# Patient Record
Sex: FEMALE | Race: WHITE | NOT HISPANIC OR LATINO | Employment: FULL TIME | URBAN - METROPOLITAN AREA
[De-identification: names, ages, dates, MRNs, and addresses within clinical notes are randomized per-mention and may not be internally consistent; named-entity substitution may affect disease eponyms.]

---

## 2023-08-24 DIAGNOSIS — E07.9 THYROID DISORDER: Primary | ICD-10-CM

## 2023-08-24 RX ORDER — LEVOTHYROXINE SODIUM 75 UG/1
75 TABLET ORAL DAILY
Qty: 90 TABLET | Refills: 0 | Status: SHIPPED | OUTPATIENT
Start: 2023-08-24 | End: 2023-09-26 | Stop reason: SDUPTHER

## 2023-08-24 RX ORDER — LEVOTHYROXINE SODIUM 75 UG/1
1 TABLET ORAL DAILY
COMMUNITY
Start: 2015-07-21 | End: 2023-08-24 | Stop reason: SDUPTHER

## 2023-08-29 DIAGNOSIS — Z30.9 ENCOUNTER FOR CONTRACEPTIVE MANAGEMENT, UNSPECIFIED TYPE: ICD-10-CM

## 2023-08-29 RX ORDER — NORETHINDRONE ACETATE AND ETHINYL ESTRADIOL 5-7-9-7
1 KIT ORAL DAILY
COMMUNITY
Start: 2023-06-06 | End: 2023-08-29 | Stop reason: SDUPTHER

## 2023-08-29 RX ORDER — NORETHINDRONE ACETATE AND ETHINYL ESTRADIOL 5-7-9-7
1 KIT ORAL DAILY
Qty: 94 TABLET | Refills: 0 | Status: SHIPPED | OUTPATIENT
Start: 2023-08-29 | End: 2023-09-26 | Stop reason: SDUPTHER

## 2023-09-19 ASSESSMENT — PROMIS GLOBAL HEALTH SCALE
RATE_GENERAL_HEALTH: VERY GOOD
RATE_QUALITY_OF_LIFE: VERY GOOD
CARRYOUT_SOCIAL_ACTIVITIES: VERY GOOD
RATE_AVERAGE_PAIN: 1
RATE_MENTAL_HEALTH: VERY GOOD
CARRYOUT_PHYSICAL_ACTIVITIES: MOSTLY
RATE_PHYSICAL_HEALTH: VERY GOOD
RATE_AVERAGE_FATIGUE: MODERATE
RATE_SOCIAL_SATISFACTION: VERY GOOD
EMOTIONAL_PROBLEMS: RARELY

## 2023-09-26 ENCOUNTER — OFFICE VISIT (OUTPATIENT)
Dept: PRIMARY CARE | Facility: CLINIC | Age: 43
End: 2023-09-26
Payer: COMMERCIAL

## 2023-09-26 VITALS
BODY MASS INDEX: 45.04 KG/M2 | SYSTOLIC BLOOD PRESSURE: 124 MMHG | HEART RATE: 78 BPM | WEIGHT: 263.8 LBS | TEMPERATURE: 97.5 F | DIASTOLIC BLOOD PRESSURE: 76 MMHG | RESPIRATION RATE: 16 BRPM | HEIGHT: 64 IN

## 2023-09-26 DIAGNOSIS — Z30.9 ENCOUNTER FOR CONTRACEPTIVE MANAGEMENT, UNSPECIFIED TYPE: ICD-10-CM

## 2023-09-26 DIAGNOSIS — Z12.31 SCREENING MAMMOGRAM, ENCOUNTER FOR: ICD-10-CM

## 2023-09-26 DIAGNOSIS — Z00.00 ROUTINE GENERAL MEDICAL EXAMINATION AT A HEALTH CARE FACILITY: Primary | ICD-10-CM

## 2023-09-26 DIAGNOSIS — E07.9 THYROID DISORDER: ICD-10-CM

## 2023-09-26 DIAGNOSIS — Z12.4 CERVICAL CANCER SCREENING: ICD-10-CM

## 2023-09-26 DIAGNOSIS — E03.9 HYPOTHYROIDISM, UNSPECIFIED TYPE: ICD-10-CM

## 2023-09-26 PROBLEM — G43.909 MIGRAINES: Status: ACTIVE | Noted: 2023-09-26

## 2023-09-26 PROBLEM — E78.5 HYPERLIPEMIA: Status: ACTIVE | Noted: 2023-09-26

## 2023-09-26 PROCEDURE — 1036F TOBACCO NON-USER: CPT | Performed by: FAMILY MEDICINE

## 2023-09-26 PROCEDURE — 87624 HPV HI-RISK TYP POOLED RSLT: CPT

## 2023-09-26 PROCEDURE — 99396 PREV VISIT EST AGE 40-64: CPT | Performed by: FAMILY MEDICINE

## 2023-09-26 PROCEDURE — 88175 CYTOPATH C/V AUTO FLUID REDO: CPT

## 2023-09-26 RX ORDER — LEVOTHYROXINE SODIUM 75 UG/1
75 TABLET ORAL DAILY
Qty: 90 TABLET | Refills: 3 | Status: SHIPPED | OUTPATIENT
Start: 2023-09-26

## 2023-09-26 RX ORDER — NORETHINDRONE ACETATE AND ETHINYL ESTRADIOL 5-7-9-7
1 KIT ORAL DAILY
Qty: 94 TABLET | Refills: 3 | Status: SHIPPED | OUTPATIENT
Start: 2023-09-26 | End: 2024-05-22 | Stop reason: SDUPTHER

## 2023-09-26 NOTE — PROGRESS NOTES
"Subjective   Patient ID: Rylie Mcintosh is a 43 y.o. female who presents for Annual Exam (Patient due for a pap smear and mammogram order too. ).    Subjective  Reason for Visit: Rylie Mcintosh is an 43 y.o. female here for a Wellness visit.   Any concerns: No    Past Medical, Surgical, and Family History reviewed and updated in chart.    Reviewed all medications by prescribing practitioner or clinical pharmacist (such as prescriptions, OTCs, herbal therapies and supplements) and documented in the medical record.     Back on BCP  Menses normal: Yes  PAP: No  Normal PAP: Yes  Mammogram: Yes  Dentist: Yes  Vitamins: No  Exercise: Yes  sometimes   Dermatologist: Yes  Immunizations up to date: No does not do flu shot   and tetanus shot             Review of Systems    Objective   /76   Pulse 78   Temp 36.4 °C (97.5 °F)   Resp 16   Ht 1.626 m (5' 4\")   Wt 120 kg (263 lb 12.8 oz)   BMI 45.28 kg/m²     Physical Exam  Vitals and nursing note reviewed.   Constitutional:       General: She is not in acute distress.     Appearance: Normal appearance.   HENT:      Head: Normocephalic and atraumatic.      Right Ear: Tympanic membrane, ear canal and external ear normal.      Left Ear: Tympanic membrane, ear canal and external ear normal.      Nose: Nose normal.      Mouth/Throat:      Lips: Pink.      Mouth: Mucous membranes are moist.      Pharynx: Oropharynx is clear. No oropharyngeal exudate or posterior oropharyngeal erythema.   Eyes:      Conjunctiva/sclera: Conjunctivae normal.   Neck:      Thyroid: No thyroid mass or thyromegaly.   Cardiovascular:      Rate and Rhythm: Normal rate and regular rhythm.      Pulses: Normal pulses.   Pulmonary:      Effort: Pulmonary effort is normal. No respiratory distress.      Breath sounds: Normal breath sounds. No wheezing.   Chest:   Breasts:     Breasts are symmetrical.      Right: Normal. No mass, skin change or tenderness.      Left: Normal. No mass, skin change or " tenderness.   Abdominal:      General: Abdomen is flat. Bowel sounds are normal. There is no distension.      Palpations: Abdomen is soft. There is no mass.      Tenderness: There is no abdominal tenderness.      Hernia: No hernia is present.   Genitourinary:     General: Normal vulva.      Labia:         Left: No tenderness or lesion.       Urethra: No urethral pain or urethral lesion.      Vagina: Normal. No vaginal discharge or lesions.      Cervix: Normal.      Uterus: Normal.       Adnexa: Right adnexa normal and left adnexa normal.   Musculoskeletal:      Cervical back: Normal range of motion and neck supple.      Right lower leg: No edema.      Left lower leg: No edema.   Lymphadenopathy:      Cervical: No cervical adenopathy.   Skin:     General: Skin is warm and dry.      Findings: No rash.   Neurological:      General: No focal deficit present.      Mental Status: She is alert and oriented to person, place, and time.      Motor: No weakness.   Psychiatric:         Mood and Affect: Mood normal.         Speech: Speech normal.         Behavior: Behavior normal. Behavior is cooperative.         Thought Content: Thought content normal.         Judgment: Judgment normal.         Assessment/Plan   Problem List Items Addressed This Visit             ICD-10-CM    Hypothyroidism E03.9    Relevant Orders    Thyroid Stimulating Hormone     Other Visit Diagnoses         Codes    Routine general medical examination at a health care facility    -  Primary Z00.00    Cervical cancer screening     Z12.4    Relevant Orders    THINPREP PAP TEST    Screening mammogram, encounter for     Z12.31    Relevant Orders    BI mammo bilateral screening tomosynthesis    Encounter for contraceptive management, unspecified type     Z30.9    Relevant Medications    Tilia Fe 1-20(5)/1-30(7) /1mg-35mcg (9) tablet    Thyroid disorder     E07.9    Relevant Medications    levothyroxine (Synthroid, Levoxyl) 75 mcg tablet

## 2023-10-05 LAB
COMPLETE PATHOLOGY REPORT: NORMAL
CONVERTED CLINICAL DIAGNOSIS-HISTORY: NORMAL
CONVERTED DIAGNOSIS COMMENT: NORMAL
CONVERTED FINAL DIAGNOSIS: NORMAL
CONVERTED FINAL REPORT PDF LINK TO COPY AND PASTE: NORMAL

## 2023-10-19 ENCOUNTER — APPOINTMENT (OUTPATIENT)
Dept: RADIOLOGY | Facility: CLINIC | Age: 43
End: 2023-10-19
Payer: COMMERCIAL

## 2023-11-20 ENCOUNTER — LAB (OUTPATIENT)
Dept: LAB | Facility: LAB | Age: 43
End: 2023-11-20
Payer: COMMERCIAL

## 2023-11-20 ENCOUNTER — ANCILLARY PROCEDURE (OUTPATIENT)
Dept: RADIOLOGY | Facility: CLINIC | Age: 43
End: 2023-11-20
Payer: COMMERCIAL

## 2023-11-20 DIAGNOSIS — E03.9 HYPOTHYROIDISM, UNSPECIFIED TYPE: ICD-10-CM

## 2023-11-20 DIAGNOSIS — R92.8 ABNORMAL MAMMOGRAM OF LEFT BREAST: Primary | ICD-10-CM

## 2023-11-20 DIAGNOSIS — Z12.31 SCREENING MAMMOGRAM, ENCOUNTER FOR: ICD-10-CM

## 2023-11-20 LAB — TSH SERPL-ACNC: 2.31 MIU/L (ref 0.44–3.98)

## 2023-11-20 PROCEDURE — 77067 SCR MAMMO BI INCL CAD: CPT

## 2023-11-20 PROCEDURE — 77063 BREAST TOMOSYNTHESIS BI: CPT | Mod: BILATERAL PROCEDURE | Performed by: RADIOLOGY

## 2023-11-20 PROCEDURE — 77067 SCR MAMMO BI INCL CAD: CPT | Mod: BILATERAL PROCEDURE | Performed by: RADIOLOGY

## 2023-11-20 PROCEDURE — 36415 COLL VENOUS BLD VENIPUNCTURE: CPT

## 2023-11-20 PROCEDURE — 84443 ASSAY THYROID STIM HORMONE: CPT

## 2023-12-14 ENCOUNTER — HOSPITAL ENCOUNTER (OUTPATIENT)
Dept: RADIOLOGY | Facility: HOSPITAL | Age: 43
Discharge: HOME | End: 2023-12-14
Payer: COMMERCIAL

## 2023-12-14 DIAGNOSIS — R92.8 ABNORMAL MAMMOGRAM OF LEFT BREAST: ICD-10-CM

## 2023-12-14 PROCEDURE — 77061 BREAST TOMOSYNTHESIS UNI: CPT | Mod: LEFT SIDE | Performed by: RADIOLOGY

## 2023-12-14 PROCEDURE — 77065 DX MAMMO INCL CAD UNI: CPT | Mod: LEFT SIDE | Performed by: RADIOLOGY

## 2023-12-14 PROCEDURE — 77061 BREAST TOMOSYNTHESIS UNI: CPT | Mod: LT

## 2023-12-14 PROCEDURE — 76642 ULTRASOUND BREAST LIMITED: CPT | Mod: LEFT SIDE | Performed by: RADIOLOGY

## 2023-12-14 PROCEDURE — 76642 ULTRASOUND BREAST LIMITED: CPT | Mod: LT

## 2023-12-14 PROCEDURE — 77065 DX MAMMO INCL CAD UNI: CPT | Mod: LT

## 2024-03-17 ENCOUNTER — TELEMEDICINE (OUTPATIENT)
Dept: PRIMARY CARE | Facility: CLINIC | Age: 44
End: 2024-03-17
Payer: COMMERCIAL

## 2024-03-17 DIAGNOSIS — B96.89 BACTERIAL CONJUNCTIVITIS OF BOTH EYES: Primary | ICD-10-CM

## 2024-03-17 DIAGNOSIS — H10.9 BACTERIAL CONJUNCTIVITIS OF BOTH EYES: Primary | ICD-10-CM

## 2024-03-17 PROCEDURE — 99213 OFFICE O/P EST LOW 20 MIN: CPT | Performed by: NURSE PRACTITIONER

## 2024-03-17 PROCEDURE — 1036F TOBACCO NON-USER: CPT | Performed by: NURSE PRACTITIONER

## 2024-03-17 RX ORDER — TOBRAMYCIN 3 MG/ML
2 SOLUTION/ DROPS OPHTHALMIC EVERY 4 HOURS
Qty: 5 ML | Refills: 0 | Status: SHIPPED | OUTPATIENT
Start: 2024-03-17 | End: 2024-03-24

## 2024-03-17 ASSESSMENT — ENCOUNTER SYMPTOMS
CONSTITUTIONAL NEGATIVE: 1
EYE REDNESS: 1
EYE DISCHARGE: 1
EYE PAIN: 0
GASTROINTESTINAL NEGATIVE: 1
RESPIRATORY NEGATIVE: 1
EYE ITCHING: 1
CARDIOVASCULAR NEGATIVE: 1
MUSCULOSKELETAL NEGATIVE: 1

## 2024-03-17 NOTE — PROGRESS NOTES
Symptoms started last night. Patient went to [employer] ([employer] and they did a complete makeover with her last night. Patient has redness, itching, crusting of both eyes. No known injury to the eye. No other URI symptoms.     Review of Systems   Constitutional: Negative.    HENT: Negative.     Eyes:  Positive for discharge, redness and itching. Negative for pain and visual disturbance.   Respiratory: Negative.     Cardiovascular: Negative.    Gastrointestinal: Negative.    Musculoskeletal: Negative.    Skin: Negative.      Objective   There were no vitals taken for this visit.    Physical Exam  Constitutional:       General: She is not in acute distress.     Appearance: Normal appearance. She is not toxic-appearing.   Eyes:      General: Lids are normal.      Conjunctiva/sclera:      Right eye: Right conjunctiva is injected.      Left eye: Left conjunctiva is injected.      Comments: Exam limited due to the nature of the visit    Neurological:      Mental Status: She is alert.     Assessment/Plan   Problem List Items Addressed This Visit    None  Visit Diagnoses         Codes    Bacterial conjunctivitis of both eyes    -  Primary H10.9, B96.89    Relevant Medications    tobramycin (Tobrex) 0.3 % ophthalmic solution        Patient will begin using antibiotic eye drops as prescribed.  Advised may use warm, moist compresses for eye drainage, crusting.  Avoid contacts while on eye drops. Wash hands frequently.  Advised may be contagious until 24 hours on eye drops. Advised ER for any worsening eye swelling/drainage or new/concerning symptoms. Follow up in person if symptoms not improving after additional 2-3 days.

## 2024-05-21 ENCOUNTER — HOSPITAL ENCOUNTER (EMERGENCY)
Facility: HOSPITAL | Age: 44
Discharge: HOME | End: 2024-05-21
Attending: EMERGENCY MEDICINE
Payer: COMMERCIAL

## 2024-05-21 ENCOUNTER — APPOINTMENT (OUTPATIENT)
Dept: RADIOLOGY | Facility: HOSPITAL | Age: 44
End: 2024-05-21
Payer: COMMERCIAL

## 2024-05-21 ENCOUNTER — APPOINTMENT (OUTPATIENT)
Dept: CARDIOLOGY | Facility: HOSPITAL | Age: 44
End: 2024-05-21
Payer: COMMERCIAL

## 2024-05-21 VITALS
WEIGHT: 270 LBS | HEIGHT: 64 IN | RESPIRATION RATE: 18 BRPM | HEART RATE: 82 BPM | DIASTOLIC BLOOD PRESSURE: 99 MMHG | BODY MASS INDEX: 46.1 KG/M2 | SYSTOLIC BLOOD PRESSURE: 172 MMHG | TEMPERATURE: 97 F | OXYGEN SATURATION: 98 %

## 2024-05-21 DIAGNOSIS — I10 HYPERTENSION, UNSPECIFIED TYPE: Primary | ICD-10-CM

## 2024-05-21 LAB
ALBUMIN SERPL BCP-MCNC: 4.2 G/DL (ref 3.4–5)
ALP SERPL-CCNC: 44 U/L (ref 33–110)
ALT SERPL W P-5'-P-CCNC: 16 U/L (ref 7–45)
ANION GAP SERPL CALC-SCNC: 12 MMOL/L (ref 10–20)
AST SERPL W P-5'-P-CCNC: 19 U/L (ref 9–39)
B-HCG SERPL-ACNC: <2 MIU/ML
BASOPHILS # BLD AUTO: 0.03 X10*3/UL (ref 0–0.1)
BASOPHILS NFR BLD AUTO: 0.4 %
BILIRUB SERPL-MCNC: 0.3 MG/DL (ref 0–1.2)
BUN SERPL-MCNC: 12 MG/DL (ref 6–23)
CALCIUM SERPL-MCNC: 9.1 MG/DL (ref 8.6–10.3)
CARDIAC TROPONIN I PNL SERPL HS: 3 NG/L (ref 0–13)
CARDIAC TROPONIN I PNL SERPL HS: <3 NG/L (ref 0–13)
CHLORIDE SERPL-SCNC: 103 MMOL/L (ref 98–107)
CO2 SERPL-SCNC: 25 MMOL/L (ref 21–32)
CREAT SERPL-MCNC: 0.83 MG/DL (ref 0.5–1.05)
EGFRCR SERPLBLD CKD-EPI 2021: 89 ML/MIN/1.73M*2
EOSINOPHIL # BLD AUTO: 0.11 X10*3/UL (ref 0–0.7)
EOSINOPHIL NFR BLD AUTO: 1.6 %
ERYTHROCYTE [DISTWIDTH] IN BLOOD BY AUTOMATED COUNT: 12.8 % (ref 11.5–14.5)
GLUCOSE SERPL-MCNC: 124 MG/DL (ref 74–99)
HCT VFR BLD AUTO: 42.5 % (ref 36–46)
HGB BLD-MCNC: 13.7 G/DL (ref 12–16)
IMM GRANULOCYTES # BLD AUTO: 0.03 X10*3/UL (ref 0–0.7)
IMM GRANULOCYTES NFR BLD AUTO: 0.4 % (ref 0–0.9)
LYMPHOCYTES # BLD AUTO: 1.4 X10*3/UL (ref 1.2–4.8)
LYMPHOCYTES NFR BLD AUTO: 20.5 %
MAGNESIUM SERPL-MCNC: 2.1 MG/DL (ref 1.6–2.4)
MCH RBC QN AUTO: 28.1 PG (ref 26–34)
MCHC RBC AUTO-ENTMCNC: 32.2 G/DL (ref 32–36)
MCV RBC AUTO: 87 FL (ref 80–100)
MONOCYTES # BLD AUTO: 0.55 X10*3/UL (ref 0.1–1)
MONOCYTES NFR BLD AUTO: 8.1 %
NEUTROPHILS # BLD AUTO: 4.7 X10*3/UL (ref 1.2–7.7)
NEUTROPHILS NFR BLD AUTO: 69 %
NRBC BLD-RTO: 0 /100 WBCS (ref 0–0)
PLATELET # BLD AUTO: 310 X10*3/UL (ref 150–450)
POTASSIUM SERPL-SCNC: 3.8 MMOL/L (ref 3.5–5.3)
PROT SERPL-MCNC: 7.3 G/DL (ref 6.4–8.2)
RBC # BLD AUTO: 4.88 X10*6/UL (ref 4–5.2)
SODIUM SERPL-SCNC: 136 MMOL/L (ref 136–145)
WBC # BLD AUTO: 6.8 X10*3/UL (ref 4.4–11.3)

## 2024-05-21 PROCEDURE — 80053 COMPREHEN METABOLIC PANEL: CPT | Performed by: EMERGENCY MEDICINE

## 2024-05-21 PROCEDURE — 84702 CHORIONIC GONADOTROPIN TEST: CPT | Performed by: EMERGENCY MEDICINE

## 2024-05-21 PROCEDURE — 84484 ASSAY OF TROPONIN QUANT: CPT | Performed by: EMERGENCY MEDICINE

## 2024-05-21 PROCEDURE — 71045 X-RAY EXAM CHEST 1 VIEW: CPT | Performed by: RADIOLOGY

## 2024-05-21 PROCEDURE — 99285 EMERGENCY DEPT VISIT HI MDM: CPT | Performed by: EMERGENCY MEDICINE

## 2024-05-21 PROCEDURE — 85025 COMPLETE CBC W/AUTO DIFF WBC: CPT | Performed by: EMERGENCY MEDICINE

## 2024-05-21 PROCEDURE — 93005 ELECTROCARDIOGRAM TRACING: CPT

## 2024-05-21 PROCEDURE — 83735 ASSAY OF MAGNESIUM: CPT | Performed by: EMERGENCY MEDICINE

## 2024-05-21 PROCEDURE — 36415 COLL VENOUS BLD VENIPUNCTURE: CPT | Performed by: EMERGENCY MEDICINE

## 2024-05-21 PROCEDURE — 93010 ELECTROCARDIOGRAM REPORT: CPT | Performed by: EMERGENCY MEDICINE

## 2024-05-21 PROCEDURE — 99283 EMERGENCY DEPT VISIT LOW MDM: CPT | Mod: 25

## 2024-05-21 PROCEDURE — 71045 X-RAY EXAM CHEST 1 VIEW: CPT

## 2024-05-21 RX ORDER — LOSARTAN POTASSIUM 50 MG/1
50 TABLET ORAL DAILY
Qty: 5 TABLET | Refills: 0 | Status: SHIPPED | OUTPATIENT
Start: 2024-05-21 | End: 2024-05-21

## 2024-05-21 RX ORDER — LOSARTAN POTASSIUM 50 MG/1
50 TABLET ORAL DAILY
Qty: 5 TABLET | Refills: 0 | Status: SHIPPED | OUTPATIENT
Start: 2024-05-21 | End: 2024-05-22 | Stop reason: SDUPTHER

## 2024-05-21 ASSESSMENT — COLUMBIA-SUICIDE SEVERITY RATING SCALE - C-SSRS
6. HAVE YOU EVER DONE ANYTHING, STARTED TO DO ANYTHING, OR PREPARED TO DO ANYTHING TO END YOUR LIFE?: NO
1. IN THE PAST MONTH, HAVE YOU WISHED YOU WERE DEAD OR WISHED YOU COULD GO TO SLEEP AND NOT WAKE UP?: NO
2. HAVE YOU ACTUALLY HAD ANY THOUGHTS OF KILLING YOURSELF?: NO

## 2024-05-21 ASSESSMENT — LIFESTYLE VARIABLES
TOTAL SCORE: 0
EVER HAD A DRINK FIRST THING IN THE MORNING TO STEADY YOUR NERVES TO GET RID OF A HANGOVER: NO
EVER FELT BAD OR GUILTY ABOUT YOUR DRINKING: NO
HAVE PEOPLE ANNOYED YOU BY CRITICIZING YOUR DRINKING: NO
HAVE YOU EVER FELT YOU SHOULD CUT DOWN ON YOUR DRINKING: NO

## 2024-05-21 ASSESSMENT — PAIN SCALES - GENERAL: PAINLEVEL_OUTOF10: 0 - NO PAIN

## 2024-05-21 ASSESSMENT — PAIN - FUNCTIONAL ASSESSMENT: PAIN_FUNCTIONAL_ASSESSMENT: 0-10

## 2024-05-21 NOTE — ED PROVIDER NOTES
EMERGENCY DEPARTMENT ENCOUNTER      Pt Name: Rylie Mcintosh  MRN: [id number]  Birthdate 1980  Date of evaluation: 5/21/2024  Provider: Carolina Cook MD    CHIEF COMPLAINT       Chief Complaint   Patient presents with    Hypertension         HISTORY OF PRESENT ILLNESS    HPI  Rylie is a 44-year-old female with a history of migraine, hyperlipidemia, hypothyroidism, obesity, and elevated blood pressure who presents to the ED with complaint of hypertension.  Patient stated that she been having headache for 4 days which she thought was due to her migraine.  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  The nurse checked her blood pressure and found that it was elevated.  So she was referred to the ED.  On arrival to the ED, blood pressure was 213/101.  Patient stated headache has resolved after she took Excedrin and Sudafed.  Family history significant for hypertension and diabetes in both parents.  She denies headache, fever, dizziness, lightheadedness, change in her vision, nausea, vomiting, shortness of breath, chest pain, abdominal pain, or urinary symptoms.        Nursing Notes were reviewed.    PAST MEDICAL HISTORY     Past Medical History:   Diagnosis Date    Calculus of gallbladder without cholecystitis without obstruction 03/28/2016    Gallstones    Unspecified abnormal cytological findings in specimens from vagina     Abnormal vaginal Pap smear         SURGICAL HISTORY       Past Surgical History:   Procedure Laterality Date    CHOLECYSTECTOMY  10/10/2016    Cholecystectomy Laparoscopic    MOUTH SURGERY  03/28/2016    Oral Surgery Tooth Extraction         CURRENT MEDICATIONS       Previous Medications    LEVOTHYROXINE (SYNTHROID, LEVOXYL) 75 MCG TABLET    Take 1 tablet (75 mcg) by mouth once daily.    TILIA FE 1-20(5)/1-30(7) /1MG-35MCG (9) TABLET    Take 1 tablet by mouth once daily.       ALLERGIES     Sulfa (sulfonamide antibiotics)    FAMILY HISTORY     No  family history on file.       SOCIAL HISTORY       Social History     Socioeconomic History    Marital status:      Spouse name: Not on file    Number of children: Not on file    Years of education: Not on file    Highest education level: Not on file   Occupational History    Not on file   Tobacco Use    Smoking status: Never    Smokeless tobacco: Never   Substance and Sexual Activity    Alcohol use: Yes    Drug use: Never    Sexual activity: Not on file   Other Topics Concern    Not on file   Social History Narrative    Not on file     Social Determinants of Health     Financial Resource Strain: Not on file   Food Insecurity: Not on file   Transportation Needs: Not on file   Physical Activity: Not on file   Stress: Not on file   Social Connections: Not on file   Intimate Partner Violence: Not on file   Housing Stability: Not on file       SCREENINGS                        PHYSICAL EXAM    (up to 7 for level 4, 8 or more for level 5)     ED Triage Vitals [05/21/24 1416]   Temperature Heart Rate Respirations BP   36.1 °C (97 °F) 99 18 (!) 213/101      Pulse Ox Temp Source Heart Rate Source Patient Position   99 % Temporal Monitor Sitting      BP Location FiO2 (%)     -- --       Physical Exam  Vitals and nursing note reviewed.   Constitutional:       General: She is not in acute distress.     Appearance: Normal appearance. She is well-developed. She is obese. She is not ill-appearing, toxic-appearing or diaphoretic.   HENT:      Head: Normocephalic and atraumatic.      Right Ear: External ear normal.      Left Ear: External ear normal.      Nose: No congestion or rhinorrhea.      Mouth/Throat:      Pharynx: No oropharyngeal exudate or posterior oropharyngeal erythema.   Eyes:      General: No scleral icterus.        Right eye: No discharge.         Left eye: No discharge.      Extraocular Movements: Extraocular movements intact.      Conjunctiva/sclera: Conjunctivae normal.      Pupils: Pupils are equal,  round, and reactive to light.   Cardiovascular:      Rate and Rhythm: Normal rate and regular rhythm.      Pulses: Normal pulses.      Heart sounds: Normal heart sounds. No murmur heard.     No friction rub. No gallop.   Pulmonary:      Effort: Pulmonary effort is normal. No respiratory distress.      Breath sounds: Normal breath sounds. No stridor. No wheezing, rhonchi or rales.   Abdominal:      General: There is no distension.      Palpations: Abdomen is soft.      Tenderness: There is no abdominal tenderness. There is no guarding.   Musculoskeletal:         General: No swelling, tenderness, deformity or signs of injury.      Cervical back: Normal range of motion and neck supple. No rigidity.      Right lower leg: No edema.      Left lower leg: No edema.   Skin:     General: Skin is warm and dry.      Capillary Refill: Capillary refill takes less than 2 seconds.      Coloration: Skin is not jaundiced or pale.      Findings: No bruising, erythema, lesion or rash.   Neurological:      General: No focal deficit present.      Mental Status: She is alert and oriented to person, place, and time. Mental status is at baseline.      Sensory: No sensory deficit.      Motor: No weakness.   Psychiatric:         Mood and Affect: Mood normal.         Behavior: Behavior normal.         Thought Content: Thought content normal.          DIAGNOSTIC RESULTS     LABS:  Labs Reviewed   COMPREHENSIVE METABOLIC PANEL - Abnormal       Result Value    Glucose 124 (*)     Sodium 136      Potassium 3.8      Chloride 103      Bicarbonate 25      Anion Gap 12      Urea Nitrogen 12      Creatinine 0.83      eGFR 89      Calcium 9.1      Albumin 4.2      Alkaline Phosphatase 44      Total Protein 7.3      AST 19      Bilirubin, Total 0.3      ALT 16     MAGNESIUM - Normal    Magnesium 2.10     SERIAL TROPONIN-INITIAL - Normal    Troponin I, High Sensitivity <3      Narrative:     Less than 99th percentile of normal range cutoff-  Female and  children under 18 years old <14 ng/L; Male <21 ng/L: Negative  Repeat testing should be performed if clinically indicated.     Female and children under 18 years old 14-50 ng/L; Male 21-50 ng/L:  Consistent with possible cardiac damage and possible increased clinical   risk. Serial measurements may help to assess extent of myocardial damage.     >50 ng/L: Consistent with cardiac damage, increased clinical risk and  myocardial infarction. Serial measurements may help assess extent of   myocardial damage.      NOTE: Children less than 1 year old may have higher baseline troponin   levels and results should be interpreted in conjunction with the overall   clinical context.     NOTE: Troponin I testing is performed using a different   testing methodology at [facility] than at other   [facility]. Direct result comparisons should only   be made within the same method.   CBC WITH AUTO DIFFERENTIAL    WBC 6.8      nRBC 0.0      RBC 4.88      Hemoglobin 13.7      Hematocrit 42.5      MCV 87      MCH 28.1      MCHC 32.2      RDW 12.8      Platelets 310      Neutrophils % 69.0      Immature Granulocytes %, Automated 0.4      Lymphocytes % 20.5      Monocytes % 8.1      Eosinophils % 1.6      Basophils % 0.4      Neutrophils Absolute 4.70      Immature Granulocytes Absolute, Automated 0.03      Lymphocytes Absolute 1.40      Monocytes Absolute 0.55      Eosinophils Absolute 0.11      Basophils Absolute 0.03     TROPONIN SERIES- (INITIAL, 1 HR)    Narrative:     The following orders were created for panel order Troponin Series, (0, 1 HR).  Procedure                               Abnormality         Status                     ---------                               -----------         ------                     Troponin I, High Sensiti...[[id number]]  Normal              Final result               Troponin, High Sensitivi...[[id number]]                                                   Please view results for  these tests on the individual orders.   SERIAL TROPONIN, 1 HOUR       All other labs were within normal range or not returned as of this dictation.    Imaging  XR chest 1 view   Final Result   No acute cardiopulmonary disease.        MACRO:   none        Signed by: Katelynn Franco 5/21/2024 2:46 PM   Dictation workstation:   [id number]           Procedures  Procedures     EMERGENCY DEPARTMENT COURSE/MDM:     Diagnoses as of 05/21/24 1729   Hypertension, unspecified type        Medical Decision Making  Patient is a 44-year-old female with a history of migraine, hyperlipidemia, hypothyroidism, obesity, and elevated blood pressure who presents to the ED with complaint of hypertension.  Awake, alert, and oriented.  She is well-appearing, afebrile, nontoxic, not in apparent distress.  Cardiac workup was ordered.  Lab results are within normal ranges.  Chest x-ray shows no acute cardiopulmonary finding.    EKG shows normal sinus rhythm with 81 bpm, normal axis position, no QT prolongation, no ST elevation.    The case was discussed with the attending, Dr. Rich, who saw and evaluated the patient at the bedside.  The patient was updated with the lab and imaging results.  Will consider starting the patient on medication for blood pressure, but given that she has never been diagnosed or treated for high blood pressure, medicine was consulted to discuss management plan.  Dr. Chapman recommended to start the patient on 50 mg of losartan daily and have her follow-up outpatient by Friday.  On reassessment, the patient is hemodynamically stable and was informed about the management plan. She expressed understanding and agreed to the plan.  The patient was discharged in a stable condition with return precautions.        Patient and or family in agreement and understanding of treatment plan.  All questions answered.      I reviewed the case with the attending ED physician. The attending ED physician agrees with the plan. Patient  and/or patient´s representative was counseled regarding labs, imaging, likely diagnosis, and plan. All questions were answered.    ED Medications administered this visit:  Medications - No data to display    New Prescriptions from this visit:    New Prescriptions    No medications on file       Follow-up:  No follow-up provider specified.      Final Impression: No diagnosis found.      (Please note that portions of this note were completed with a voice recognition program.  Efforts were made to edit the dictations but occasionally words are mis-transcribed.)     Carolina Cook MD  Resident  05/21/24 [id number]

## 2024-05-21 NOTE — DISCHARGE INSTRUCTIONS
Thank you for giving us the opportunity to take care of you today.  Please take medication as prescribed at home.     Please follow-up with Dr. Pappas in within 1 to 2 days regarding your ED visit.    You should seek immediate medical attention if you experience new or worsening symptoms such as severe headache, change in vision, dizziness, lightheadedness, shortness of breath, chest pain, nausea, vomiting, abdominal pain, or fever.

## 2024-05-21 NOTE — ED NOTES
Patient to room B c/o headache for 2 days states her blood pressure is high no history of high blood pressure     Julianna Aquino, BETHANY  05/21/24 4006

## 2024-05-22 ENCOUNTER — OFFICE VISIT (OUTPATIENT)
Dept: PRIMARY CARE | Facility: CLINIC | Age: 44
End: 2024-05-22
Payer: COMMERCIAL

## 2024-05-22 VITALS
WEIGHT: 175 LBS | BODY MASS INDEX: 29.88 KG/M2 | DIASTOLIC BLOOD PRESSURE: 85 MMHG | HEIGHT: 64 IN | HEART RATE: 97 BPM | SYSTOLIC BLOOD PRESSURE: 141 MMHG

## 2024-05-22 DIAGNOSIS — N92.6 MISSED PERIODS: ICD-10-CM

## 2024-05-22 DIAGNOSIS — R06.83 SNORES: ICD-10-CM

## 2024-05-22 DIAGNOSIS — Z13.1 DIABETES MELLITUS SCREENING: ICD-10-CM

## 2024-05-22 DIAGNOSIS — Z30.9 ENCOUNTER FOR CONTRACEPTIVE MANAGEMENT, UNSPECIFIED TYPE: ICD-10-CM

## 2024-05-22 DIAGNOSIS — E03.9 HYPOTHYROIDISM, UNSPECIFIED TYPE: ICD-10-CM

## 2024-05-22 DIAGNOSIS — I10 HTN (HYPERTENSION), BENIGN: Primary | ICD-10-CM

## 2024-05-22 DIAGNOSIS — Z78.9 NON-SMOKER: ICD-10-CM

## 2024-05-22 DIAGNOSIS — E78.5 HYPERLIPIDEMIA, UNSPECIFIED HYPERLIPIDEMIA TYPE: ICD-10-CM

## 2024-05-22 DIAGNOSIS — I10 HYPERTENSION, UNSPECIFIED TYPE: ICD-10-CM

## 2024-05-22 PROCEDURE — 1036F TOBACCO NON-USER: CPT | Performed by: INTERNAL MEDICINE

## 2024-05-22 PROCEDURE — 3079F DIAST BP 80-89 MM HG: CPT | Performed by: INTERNAL MEDICINE

## 2024-05-22 PROCEDURE — 99215 OFFICE O/P EST HI 40 MIN: CPT | Performed by: INTERNAL MEDICINE

## 2024-05-22 PROCEDURE — 3077F SYST BP >= 140 MM HG: CPT | Performed by: INTERNAL MEDICINE

## 2024-05-22 RX ORDER — LOSARTAN POTASSIUM 50 MG/1
50 TABLET ORAL DAILY
Qty: 90 TABLET | Refills: 0 | Status: SHIPPED | OUTPATIENT
Start: 2024-05-22 | End: 2024-05-25 | Stop reason: SDUPTHER

## 2024-05-22 RX ORDER — NORETHINDRONE ACETATE AND ETHINYL ESTRADIOL 5-7-9-7
1 KIT ORAL DAILY
Qty: 94 TABLET | Refills: 3 | Status: SHIPPED | OUTPATIENT
Start: 2024-05-22

## 2024-05-22 RX ORDER — LOSARTAN POTASSIUM 50 MG/1
50 TABLET ORAL DAILY
Qty: 30 TABLET | Refills: 0 | Status: SHIPPED | OUTPATIENT
Start: 2024-05-22 | End: 2024-05-22

## 2024-05-22 RX ORDER — LOSARTAN POTASSIUM 50 MG/1
50 TABLET ORAL DAILY
Qty: 30 TABLET | Refills: 0 | Status: SHIPPED | OUTPATIENT
Start: 2024-05-22 | End: 2024-05-22 | Stop reason: SDUPTHER

## 2024-05-22 ASSESSMENT — PATIENT HEALTH QUESTIONNAIRE - PHQ9
2. FEELING DOWN, DEPRESSED OR HOPELESS: NOT AT ALL
SUM OF ALL RESPONSES TO PHQ9 QUESTIONS 1 AND 2: 0
1. LITTLE INTEREST OR PLEASURE IN DOING THINGS: NOT AT ALL

## 2024-05-22 NOTE — PROGRESS NOTES
"Subjective   Patient ID: Rylie Mcintosh is a 44 y.o. female who presents for Follow-up ([facility] ER 5/21 follow up HTN).    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 Pt states that overall she is a healthy person, but for the last couple months she did not \"feel herself\". Pt states that she feels tired and has no MP for the last 3 months. Pt states that she is on the same OCP for the years and had a negative U pregnancy test yesterday at ED. Pt denies any abd pain. Pt states that she had some BW done last night and was prescribed losartan 50 mg po daily. The BP readings improved this morning. During the clinical encounter pt denies fever, chills, no SOB, no chest pain/tightness, no abdominal pain, no N/V/D reported, no change of urination reported. Pt denies any other health concerns during this visit.  Sohx: reviewed  List of the medications and allergies: reviewed  PMHx and Fhx: reviewed    Review of Systems  The systems have been reviewed as follows:   Constitutional: no fever, no chills,  but as mentioned at HPI  Eyes: no eyesight problems, no eye redness, no eye pain, no blurred vision  ENT: no ear pain or sore throat, no nasal discharge or congestion, no sinus pressure, no hearing loss  Cardiovascular: no chest pain or tightness, no SOB, the heart rate was not fast or slow  Respiratory: no cough, no dyspnea with exertion or with rest, no wheezing  Gastrointestinal: no abdominal pain, no constipation or diarrhea, no heartburn, no nausea or vomiting  Genitourinary: no urine frequency, no dysuria, no urinary urgency, no urinary incontinence or retention, but as mentioned at HPI  Musculoskeletal: no back pain, no arthralgia, no joint swelling or stiffness, no muscle weakness  Integumentary: no skin lesions or rash  Neurological: no headaches, no dizziness or fainting, no limb weakness  Psychiatric: no mood changes, no anxiety or depression, no " "SI/HI  Endocrine: no weight change, no temperature intolerance, no   change of appetite  Hematologic/Lymphatic: no easy bruising or bleeding  Objective   /85 (BP Location: Left arm, Patient Position: Sitting)   Pulse 97   Ht 1.626 m (5' 4\")   Wt 79.4 kg (175 lb)   BMI 30.04 kg/m²     Physical Exam  Constitutional: well hydrated, well nourished, no acute distress. Vital signs reviewed and BP reading was repeated 135/85 mmm HG  Head and face: normocephalic, atraumatic  Eyes: no erythema, edema or visible abnormalities of conjunctiva or lids. Pupils are equal, round and reactive to light. Extraocular movement normal  ENT: external ears without deformities  Neck: full ROM, no adenopathy, neck was supple, no palpable nodules of thyroid gland  Pulmonary: normal respiratory rate and effort. Lungs are clear to auscultation, no rales,no rhonchi or wheezing  Cardiovascular: RRR, normal S1 and S2, no murmur, no gallop, posterior tib. pulse normal 2+ bilaterally, no lower extremity edema  Abdomen: soft, non tender on palpation, not distended, normal BS in all 4 quadrants, no CVA tenderness  Musculoskeletal: no joint swelling, normal movements of all 4 extremities. Gait and station: normal, Digits and nails: normal without clubbing  Skin: normal color, no rash  Neurologic: Cranial nerves: CN II: visual acuity intact. Source: acuity reported by patient. Pupils reactive to light with normal accommodation. Right and left pupil normal CN III, IV and VI: the EO movements were intact. CN VIII: no hearing loss. Deep tendon reflexes: were 2+ and symmetric: R and L patella.  Sensory exam: normal to light touch  Psychiatric: Mood and affect: normal, Alert and Oriented x 3  Lymphatic: no cervical lymphadenopathy  Assessment/Plan   HTN, newly diagnosed:  Hx and PE as mentioned  The recent ED report was reviewed after the verbal permission was secured form the pt  Will provide the refill on losartan 50 mg PO daily  Pt was advised " to check BP on the regular basis  Will order UA and U alb  Will screen for DM    HLD: will check lipid panel  Hypothyroidism: Pt takes levothyroxine 75 mcg po daily. Will check TSH/T4 level  Missed MP: will check prolactin level, FSH/LH, DHEA. Will order US of the pelvis. Will bring the GYN team on board  The OCP was refilled as per the pts request  Pt reports chronic fatigue and snores:  The STOP BANG score at least 3  Will check vit D level and provide the referral for the sleep medicine team evaluation    I discussed every sxs with the pt in detail. Pt verbalized understanding of the health  condition and agreed with the clinical approach as discussed as mentioned above  Please FU with PCP as planned or sooner if needed.

## 2024-05-24 ENCOUNTER — TELEPHONE (OUTPATIENT)
Dept: PRIMARY CARE | Facility: CLINIC | Age: 44
End: 2024-05-24

## 2024-05-24 ENCOUNTER — LAB (OUTPATIENT)
Dept: LAB | Facility: LAB | Age: 44
End: 2024-05-24
Payer: COMMERCIAL

## 2024-05-24 DIAGNOSIS — E03.9 HYPOTHYROIDISM, UNSPECIFIED TYPE: ICD-10-CM

## 2024-05-24 DIAGNOSIS — Z13.1 DIABETES MELLITUS SCREENING: ICD-10-CM

## 2024-05-24 DIAGNOSIS — N92.6 MISSED PERIODS: ICD-10-CM

## 2024-05-24 DIAGNOSIS — I10 HTN (HYPERTENSION), BENIGN: ICD-10-CM

## 2024-05-24 DIAGNOSIS — E78.5 HYPERLIPIDEMIA, UNSPECIFIED HYPERLIPIDEMIA TYPE: ICD-10-CM

## 2024-05-24 LAB
25(OH)D3 SERPL-MCNC: 25 NG/ML (ref 30–100)
APPEARANCE UR: CLEAR
BILIRUB UR STRIP.AUTO-MCNC: NEGATIVE MG/DL
CHOLEST SERPL-MCNC: 249 MG/DL (ref 0–199)
CHOLESTEROL/HDL RATIO: 2.7
COLOR UR: COLORLESS
CREAT UR-MCNC: 22.6 MG/DL (ref 20–320)
EST. AVERAGE GLUCOSE BLD GHB EST-MCNC: 97 MG/DL
FSH SERPL-ACNC: 1.7 IU/L
GLUCOSE UR STRIP.AUTO-MCNC: NORMAL MG/DL
HBA1C MFR BLD: 5 %
HDLC SERPL-MCNC: 93.1 MG/DL
KETONES UR STRIP.AUTO-MCNC: NEGATIVE MG/DL
LDLC SERPL CALC-MCNC: 123 MG/DL
LEUKOCYTE ESTERASE UR QL STRIP.AUTO: ABNORMAL
LH SERPL-ACNC: 0.1 IU/L
MICROALBUMIN UR-MCNC: <7 MG/L
MICROALBUMIN/CREAT UR: NORMAL MG/G{CREAT}
NITRITE UR QL STRIP.AUTO: NEGATIVE
NON HDL CHOLESTEROL: 156 MG/DL (ref 0–149)
PH UR STRIP.AUTO: 5.5 [PH]
PROLACTIN SERPL-MCNC: 6.9 UG/L (ref 3–20)
PROT UR STRIP.AUTO-MCNC: NEGATIVE MG/DL
RBC # UR STRIP.AUTO: NEGATIVE /UL
RBC #/AREA URNS AUTO: ABNORMAL /HPF
SP GR UR STRIP.AUTO: 1
SQUAMOUS #/AREA URNS AUTO: ABNORMAL /HPF
TRIGL SERPL-MCNC: 163 MG/DL (ref 0–149)
TSH SERPL-ACNC: 1.86 MIU/L (ref 0.44–3.98)
UROBILINOGEN UR STRIP.AUTO-MCNC: NORMAL MG/DL
VLDL: 33 MG/DL (ref 0–40)
WBC #/AREA URNS AUTO: ABNORMAL /HPF

## 2024-05-24 PROCEDURE — 82306 VITAMIN D 25 HYDROXY: CPT

## 2024-05-24 PROCEDURE — 83001 ASSAY OF GONADOTROPIN (FSH): CPT

## 2024-05-24 PROCEDURE — 84443 ASSAY THYROID STIM HORMONE: CPT

## 2024-05-24 PROCEDURE — 81001 URINALYSIS AUTO W/SCOPE: CPT

## 2024-05-24 PROCEDURE — 83036 HEMOGLOBIN GLYCOSYLATED A1C: CPT

## 2024-05-24 PROCEDURE — 36415 COLL VENOUS BLD VENIPUNCTURE: CPT

## 2024-05-24 PROCEDURE — 83002 ASSAY OF GONADOTROPIN (LH): CPT

## 2024-05-24 PROCEDURE — 82626 DEHYDROEPIANDROSTERONE: CPT

## 2024-05-24 PROCEDURE — 84146 ASSAY OF PROLACTIN: CPT

## 2024-05-24 PROCEDURE — 82043 UR ALBUMIN QUANTITATIVE: CPT

## 2024-05-24 PROCEDURE — 80061 LIPID PANEL: CPT

## 2024-05-24 PROCEDURE — 82570 ASSAY OF URINE CREATININE: CPT

## 2024-05-25 DIAGNOSIS — I10 HYPERTENSION, UNSPECIFIED TYPE: ICD-10-CM

## 2024-05-25 DIAGNOSIS — I10 HTN (HYPERTENSION), BENIGN: ICD-10-CM

## 2024-05-25 RX ORDER — LOSARTAN POTASSIUM 50 MG/1
50 TABLET ORAL DAILY
Qty: 30 TABLET | Refills: 0 | Status: SHIPPED | OUTPATIENT
Start: 2024-05-25 | End: 2024-06-24

## 2024-05-26 LAB
ATRIAL RATE: 81 BPM
P AXIS: 47 DEGREES
P OFFSET: 205 MS
P ONSET: 150 MS
PR INTERVAL: 138 MS
Q ONSET: 219 MS
QRS COUNT: 13 BEATS
QRS DURATION: 86 MS
QT INTERVAL: 370 MS
QTC CALCULATION(BAZETT): 429 MS
QTC FREDERICIA: 408 MS
R AXIS: 43 DEGREES
T AXIS: 53 DEGREES
T OFFSET: 404 MS
VENTRICULAR RATE: 81 BPM

## 2024-05-28 ENCOUNTER — TELEPHONE (OUTPATIENT)
Dept: PRIMARY CARE | Facility: CLINIC | Age: 44
End: 2024-05-28
Payer: COMMERCIAL

## 2024-05-28 NOTE — TELEPHONE ENCOUNTER
----- Message from Kylah Gonzalez MD sent at 5/28/2024  9:12 AM EDT -----  Recent BW showed elevated cholesterol level, Vitamin D level decreased.  Please, FU within the next 1-2 weeks in the office.

## 2024-05-30 ENCOUNTER — HOSPITAL ENCOUNTER (OUTPATIENT)
Dept: RADIOLOGY | Facility: CLINIC | Age: 44
Discharge: HOME | End: 2024-05-30
Payer: COMMERCIAL

## 2024-05-30 DIAGNOSIS — N92.6 MISSED PERIODS: ICD-10-CM

## 2024-05-30 LAB — DHEA SERPL-MCNC: 2.21 NG/ML (ref 0.63–4.7)

## 2024-05-30 PROCEDURE — 76856 US EXAM PELVIC COMPLETE: CPT | Performed by: RADIOLOGY

## 2024-05-30 PROCEDURE — 93975 VASCULAR STUDY: CPT | Performed by: RADIOLOGY

## 2024-05-30 PROCEDURE — 76830 TRANSVAGINAL US NON-OB: CPT | Performed by: RADIOLOGY

## 2024-05-30 PROCEDURE — 76856 US EXAM PELVIC COMPLETE: CPT

## 2024-06-17 ASSESSMENT — ENCOUNTER SYMPTOMS
SWEATS: 0
HEADACHES: 1
BLURRED VISION: 0
ORTHOPNEA: 0
PND: 0
NECK PAIN: 0
PALPITATIONS: 0
HYPERTENSION: 1
SHORTNESS OF BREATH: 0

## 2024-06-24 ENCOUNTER — APPOINTMENT (OUTPATIENT)
Dept: PRIMARY CARE | Facility: CLINIC | Age: 44
End: 2024-06-24
Payer: COMMERCIAL

## 2024-06-24 VITALS
HEIGHT: 64 IN | TEMPERATURE: 97.2 F | WEIGHT: 274.8 LBS | SYSTOLIC BLOOD PRESSURE: 129 MMHG | BODY MASS INDEX: 46.92 KG/M2 | DIASTOLIC BLOOD PRESSURE: 80 MMHG

## 2024-06-24 DIAGNOSIS — Z71.2 ENCOUNTER TO DISCUSS TEST RESULTS: Primary | ICD-10-CM

## 2024-06-24 DIAGNOSIS — E66.01 CLASS 3 SEVERE OBESITY DUE TO EXCESS CALORIES WITH SERIOUS COMORBIDITY AND BODY MASS INDEX (BMI) OF 45.0 TO 49.9 IN ADULT (MULTI): ICD-10-CM

## 2024-06-24 DIAGNOSIS — E03.9 HYPOTHYROIDISM, UNSPECIFIED TYPE: ICD-10-CM

## 2024-06-24 DIAGNOSIS — E78.5 HYPERLIPIDEMIA, UNSPECIFIED HYPERLIPIDEMIA TYPE: ICD-10-CM

## 2024-06-24 DIAGNOSIS — E55.9 VITAMIN D INSUFFICIENCY: ICD-10-CM

## 2024-06-24 DIAGNOSIS — I10 HTN (HYPERTENSION), BENIGN: ICD-10-CM

## 2024-06-24 DIAGNOSIS — I10 HYPERTENSION, UNSPECIFIED TYPE: ICD-10-CM

## 2024-06-24 DIAGNOSIS — N92.6 MISSED PERIODS: ICD-10-CM

## 2024-06-24 DIAGNOSIS — Z76.0 ENCOUNTER FOR MEDICATION REFILL: ICD-10-CM

## 2024-06-24 PROBLEM — E66.813 CLASS 3 SEVERE OBESITY DUE TO EXCESS CALORIES WITH SERIOUS COMORBIDITY AND BODY MASS INDEX (BMI) OF 45.0 TO 49.9 IN ADULT: Status: ACTIVE | Noted: 2024-06-24

## 2024-06-24 PROCEDURE — 99215 OFFICE O/P EST HI 40 MIN: CPT | Performed by: INTERNAL MEDICINE

## 2024-06-24 PROCEDURE — 3074F SYST BP LT 130 MM HG: CPT | Performed by: INTERNAL MEDICINE

## 2024-06-24 PROCEDURE — 3008F BODY MASS INDEX DOCD: CPT | Performed by: INTERNAL MEDICINE

## 2024-06-24 PROCEDURE — 1036F TOBACCO NON-USER: CPT | Performed by: INTERNAL MEDICINE

## 2024-06-24 PROCEDURE — 3079F DIAST BP 80-89 MM HG: CPT | Performed by: INTERNAL MEDICINE

## 2024-06-24 RX ORDER — ERGOCALCIFEROL 1.25 MG/1
50000 CAPSULE ORAL
Qty: 12 CAPSULE | Refills: 0 | Status: SHIPPED | OUTPATIENT
Start: 2024-06-30 | End: 2024-09-22

## 2024-06-24 RX ORDER — LOSARTAN POTASSIUM 50 MG/1
50 TABLET ORAL DAILY
Qty: 90 TABLET | Refills: 0 | Status: SHIPPED | OUTPATIENT
Start: 2024-06-24 | End: 2024-09-22

## 2024-06-24 ASSESSMENT — PATIENT HEALTH QUESTIONNAIRE - PHQ9
SUM OF ALL RESPONSES TO PHQ9 QUESTIONS 1 AND 2: 0
2. FEELING DOWN, DEPRESSED OR HOPELESS: NOT AT ALL
1. LITTLE INTEREST OR PLEASURE IN DOING THINGS: NOT AT ALL

## 2024-06-24 NOTE — PROGRESS NOTES
"Subjective   Patient ID: Rylie Mcintosh is a 44 y.o. female who presents for Follow-up ([facility] 5/21-HTN. Was placed on Losartan 50 mg).    HPI Pt is a pleasant 44 y.o. CF who was seen and evaluated during the FU OV. Pt had the recent BW and US done and would like to discuss the results. Pt states that she feels \"great\" since the BP medication was started. Pt denies any SE. During the clinical encounter pt denies fever, chills, no SOB, no chest pain/tightness, no abdominal pain, no N/V/D reported, no change of urination reported. Pt denies any other health concerns during this visit.  Sohx: reviewed  List of the medications and allergies: reviewed  PMHx and Fhx: reviewed    Review of Systems  The systems have been reviewed as follows:   Constitutional: no fever, no chills  Eyes: no eyesight problems, no eye redness, no eye pain, no blurred vision  ENT: no ear pain or sore throat, no nasal discharge or congestion, no sinus pressure, no hearing loss  Cardiovascular: no chest pain or tightness, no SOB, the heart rate was not fast or slow  Respiratory: no cough, no dyspnea with exertion or with rest, no wheezing  Gastrointestinal: no abdominal pain, no constipation or diarrhea, no heartburn, no nausea or vomiting  Genitourinary: no urine frequency, no dysuria, no urinary urgency, no urinary incontinence or retention  Musculoskeletal: no back pain, no arthralgia, no joint swelling or stiffness, no muscle weakness  Integumentary: no skin lesions or rash  Neurological: no headaches, no dizziness or fainting, no limb weakness  Psychiatric: no mood changes, no anxiety or depression, no SI/HI  Endocrine: no weight change, no temperature intolerance, no   change of appetite  Hematologic/Lymphatic: no easy bruising or bleeding  Objective   /80 (BP Location: Right arm, Patient Position: Sitting)   Temp 36.2 °C (97.2 °F)   Ht 1.626 m (5' 4\")   Wt 125 kg (274 lb 12.8 oz)   BMI 47.17 kg/m²     Physical Exam  Constitutional: " well hydrated, well nourished, no acute distress. Vital signs reviewed  Head and face: normocephalic, atraumatic  Eyes: no erythema, edema or visible abnormalities of conjunctiva or lids. Pupils are equal, round and reactive to light. Extraocular movement normal  ENT: external ears without deformities  Neck: full ROM, no adenopathy, neck was supple, thyroid gland not enlarged  Pulmonary: normal respiratory rate and effort. Lungs are clear to auscultation, no rales,no rhonchi or wheezing  Cardiovascular: RRR, normal S1 and S2, no murmur, no gallop, posterior tib. pulse normal 2+ bilaterally, no lower extremity edema  Abdomen: soft, non tender on palpation, not distended, normal BS in all 4 quadrants  Musculoskeletal: no joint swelling, normal movements of all 4 extremities. Gait and station: normal, Digits and nails: normal without clubbing  Skin: normal color, no rash  Neurologic: Cranial nerves: CN II: visual acuity intact. Source: acuity reported by patient. Pupils reactive to light with normal accommodation. Right and left pupil normal CN III, IV and VI: the EO movements were intact. CN VIII: no hearing loss. Sensory exam: normal to light touch  Psychiatric: Mood and affect: normal, Alert and Oriented x 3  Lymphatic: no cervical lymphadenopathy  Assessment/Plan   Encounter to discuss the recent tests results:   I reviewed, shared and discuss the recent test results with the pt in details. Pt verbalized understandings  Vit D insufficiency: will start on supplemental vit d 50.000 InU weekly x 12 weeks  HLD: continue with the lifestyle modifications  Hypothyroidism: well controlled on the current dose of levothyroxine  HTN: controlled on the Losartan 50 mg PO daily. Refilled  Missed period. The Sex hormones WNM unremarkable US of the pelvis. Pt will see the GYN team   BMI 47: continue lifestyle modifications. Pt FU with weight watchers.  I discussed every sxs with the pt in detail. Pt verbalized understanding of the  health  condition and agreed with the clinical approach as discussed as mentioned above  Please FU with PCP as planned or sooner if needed.

## 2024-06-27 ENCOUNTER — APPOINTMENT (OUTPATIENT)
Dept: OBSTETRICS AND GYNECOLOGY | Facility: CLINIC | Age: 44
End: 2024-06-27
Payer: COMMERCIAL

## 2024-06-27 VITALS
HEIGHT: 64 IN | SYSTOLIC BLOOD PRESSURE: 128 MMHG | DIASTOLIC BLOOD PRESSURE: 76 MMHG | BODY MASS INDEX: 47.29 KG/M2 | WEIGHT: 277 LBS

## 2024-06-27 DIAGNOSIS — Z30.011 ENCOUNTER FOR INITIAL PRESCRIPTION OF CONTRACEPTIVE PILLS: ICD-10-CM

## 2024-06-27 DIAGNOSIS — Z00.00 WELL WOMAN EXAM (NO GYNECOLOGICAL EXAM): Primary | ICD-10-CM

## 2024-06-27 DIAGNOSIS — Z12.31 ENCOUNTER FOR SCREENING MAMMOGRAM FOR MALIGNANT NEOPLASM OF BREAST: ICD-10-CM

## 2024-06-27 PROCEDURE — 3008F BODY MASS INDEX DOCD: CPT | Performed by: NURSE PRACTITIONER

## 2024-06-27 PROCEDURE — 1036F TOBACCO NON-USER: CPT | Performed by: NURSE PRACTITIONER

## 2024-06-27 PROCEDURE — 3074F SYST BP LT 130 MM HG: CPT | Performed by: NURSE PRACTITIONER

## 2024-06-27 PROCEDURE — 3078F DIAST BP <80 MM HG: CPT | Performed by: NURSE PRACTITIONER

## 2024-06-27 PROCEDURE — 99386 PREV VISIT NEW AGE 40-64: CPT | Performed by: NURSE PRACTITIONER

## 2024-06-27 RX ORDER — DROSPIRENONE 4 MG/1
4 TABLET, FILM COATED ORAL DAILY
Qty: 84 TABLET | Refills: 4 | Status: SHIPPED | OUTPATIENT
Start: 2024-06-27

## 2024-06-27 ASSESSMENT — PAIN SCALES - GENERAL: PAINLEVEL: 0-NO PAIN

## 2024-06-27 NOTE — PROGRESS NOTES
"Subjective   Rylie Mcintosh is a 44 y.o. female who is here for Annual Exam.     Concerns today:  Establishing care, recently started on Losartan for HTN    Patient is perimenopausal. Periods are irregular, reports no period since January on COCs. Reports h/o heavy menses off of COCs.  Experiencing menopause symptoms? None    Sexual Activity: sexually active, male partners; Patient reports 1 partners in the last 12 months.    History of prior STI: none  Desires STI screening? No    Current contraception: OCP (estrogen/progesterone)    Last pap: 9/2023 NILM, HPV (-)  History of abnormal Pap smear: no  Family history of uterine or ovarian cancer: no    Last mammogram: 12/2023 Cat 1 - negative  History of abnormal mammogram: yes - L breast asymmetry noted in 11/2023  Family history of breast cancer: no    Objective   /76   Ht 1.626 m (5' 4\")   Wt 126 kg (277 lb)    Physical Exam  Constitutional:       General: She is not in acute distress.     Appearance: Normal appearance. She is obese.   Genitourinary:      Genitourinary Comments: GYN exam deferred, no concerns today, not due for pap   Breasts:     Breasts are symmetrical.      Breasts are soft.     Right: Normal. No mass, nipple discharge, skin change or tenderness.      Left: Normal. No mass, nipple discharge, skin change or tenderness.   Neck:      Thyroid: No thyroid mass, thyromegaly or thyroid tenderness.   Cardiovascular:      Rate and Rhythm: Normal rate and regular rhythm.   Pulmonary:      Effort: Pulmonary effort is normal. No respiratory distress.      Breath sounds: Normal breath sounds.   Abdominal:      Palpations: Abdomen is soft. There is no mass.      Tenderness: There is no abdominal tenderness. There is no right CVA tenderness or left CVA tenderness.   Musculoskeletal:      Cervical back: Neck supple.   Lymphadenopathy:      Upper Body:      Right upper body: No axillary adenopathy.      Left upper body: No axillary adenopathy. "   Neurological:      General: No focal deficit present.      Mental Status: She is alert and oriented to person, place, and time. Mental status is at baseline.   Skin:     General: Skin is warm and dry.   Psychiatric:         Mood and Affect: Mood normal.         Behavior: Behavior normal.         Thought Content: Thought content normal.         Judgment: Judgment normal.   Vitals and nursing note reviewed.     Assessment/Plan   Diagnoses and all orders for this visit:  Well woman exam (no gynecological exam)  -     Referral to Gynecology  Encounter for initial prescription of contraceptive pills  -     drospirenone, contraceptive, (Slynd) 4 mg (28) tablet; Take 1 tablet by mouth once daily.  Encounter for screening mammogram for malignant neoplasm of breast  -     BI mammo bilateral diagnostic tomosynthesis; Future    Discussed recommendation to switch to POP d/t HTN. Patient agreeable. Will assess bleeding pattern after a few months on POP.    Follow up in about 6 months (around 12/27/2024).  Sarah Quintero, APRN-CNM, APRN-CNP

## 2024-06-28 DIAGNOSIS — Z30.8 ENCOUNTER FOR OTHER CONTRACEPTIVE MANAGEMENT: ICD-10-CM

## 2024-06-28 RX ORDER — NORETHINDRONE 0.35 MG/1
1 TABLET ORAL DAILY
Qty: 28 TABLET | Refills: 12 | Status: SHIPPED | OUTPATIENT
Start: 2024-06-28 | End: 2025-06-28

## 2024-06-28 NOTE — TELEPHONE ENCOUNTER
----- Message from Rylie Tiajerome sent at 6/28/2024 12:11 AM EDT -----  Regarding: Slynd  Contact: [phone]  Hi, I was looking into my insurance and Slynd  is not covered. It is very expensive out of pocket. Can you please cancel and prescribe me the other progestin birth control? I read the pamphlet that came with the after visit linsey and it said that the other one needs to be taken within the same 3 hour window daily. That won’t be a problem!!  Thank you.

## 2024-07-01 DIAGNOSIS — Z30.8 ENCOUNTER FOR OTHER CONTRACEPTIVE MANAGEMENT: ICD-10-CM

## 2024-07-01 RX ORDER — NORETHINDRONE 0.35 MG/1
1 TABLET ORAL DAILY
Qty: 84 TABLET | Refills: 4 | Status: SHIPPED | OUTPATIENT
Start: 2024-07-01 | End: 2025-07-01

## 2024-07-01 NOTE — TELEPHONE ENCOUNTER
----- Message from Rylie Mcintosh sent at 6/28/2024  7:45 PM EDT -----  Regarding: Kaitlin  Contact: [phone]  Hi, [facility] but it is unable to be filled because it was only for one month instead of three months. Can you please send the request for Cassandra tabs for at least a three month supply?  Thank you.

## 2024-10-24 DIAGNOSIS — E07.9 THYROID DISORDER: ICD-10-CM

## 2024-10-24 RX ORDER — LEVOTHYROXINE SODIUM 75 UG/1
75 TABLET ORAL DAILY
Qty: 90 TABLET | Refills: 3 | Status: SHIPPED | OUTPATIENT
Start: 2024-10-24

## 2024-10-25 DIAGNOSIS — I10 HYPERTENSION, UNSPECIFIED TYPE: ICD-10-CM

## 2024-10-25 DIAGNOSIS — I10 HTN (HYPERTENSION), BENIGN: ICD-10-CM

## 2024-10-25 RX ORDER — LOSARTAN POTASSIUM 50 MG/1
50 TABLET ORAL DAILY
Qty: 90 TABLET | Refills: 0 | Status: SHIPPED | OUTPATIENT
Start: 2024-10-25 | End: 2025-01-23

## 2024-11-20 ENCOUNTER — HOSPITAL ENCOUNTER (OUTPATIENT)
Dept: RADIOLOGY | Facility: CLINIC | Age: 44
Discharge: HOME | End: 2024-11-20
Payer: COMMERCIAL

## 2024-11-20 VITALS — BODY MASS INDEX: 47.42 KG/M2 | WEIGHT: 277.78 LBS | HEIGHT: 64 IN

## 2024-11-20 DIAGNOSIS — Z12.31 SCREENING MAMMOGRAM FOR BREAST CANCER: ICD-10-CM

## 2024-11-20 PROCEDURE — 77067 SCR MAMMO BI INCL CAD: CPT

## 2024-11-20 PROCEDURE — 77063 BREAST TOMOSYNTHESIS BI: CPT | Performed by: RADIOLOGY

## 2024-11-20 PROCEDURE — 77067 SCR MAMMO BI INCL CAD: CPT | Performed by: RADIOLOGY

## 2024-12-05 ENCOUNTER — PATIENT MESSAGE (OUTPATIENT)
Dept: PRIMARY CARE | Facility: CLINIC | Age: 44
End: 2024-12-05
Payer: COMMERCIAL

## 2024-12-05 DIAGNOSIS — E07.9 THYROID DISORDER: ICD-10-CM

## 2024-12-05 DIAGNOSIS — I10 HTN (HYPERTENSION), BENIGN: ICD-10-CM

## 2024-12-05 DIAGNOSIS — I10 HYPERTENSION, UNSPECIFIED TYPE: ICD-10-CM

## 2024-12-05 RX ORDER — LOSARTAN POTASSIUM 50 MG/1
50 TABLET ORAL DAILY
Qty: 90 TABLET | Refills: 0 | Status: SHIPPED | OUTPATIENT
Start: 2024-12-05 | End: 2025-03-05

## 2024-12-06 RX ORDER — LEVOTHYROXINE SODIUM 75 UG/1
75 TABLET ORAL DAILY
Qty: 90 TABLET | Refills: 0 | Status: SHIPPED | OUTPATIENT
Start: 2024-12-06

## 2024-12-10 ENCOUNTER — APPOINTMENT (OUTPATIENT)
Dept: PRIMARY CARE | Facility: CLINIC | Age: 44
End: 2024-12-10
Payer: COMMERCIAL

## 2025-01-06 ENCOUNTER — APPOINTMENT (OUTPATIENT)
Dept: PRIMARY CARE | Facility: CLINIC | Age: 45
End: 2025-01-06
Payer: COMMERCIAL

## 2025-04-05 DIAGNOSIS — I10 HTN (HYPERTENSION), BENIGN: ICD-10-CM

## 2025-04-05 DIAGNOSIS — I10 HYPERTENSION, UNSPECIFIED TYPE: ICD-10-CM

## 2025-04-08 RX ORDER — LOSARTAN POTASSIUM 50 MG/1
50 TABLET ORAL DAILY
Qty: 30 TABLET | Refills: 0 | Status: SHIPPED | OUTPATIENT
Start: 2025-04-08

## 2025-04-21 ENCOUNTER — APPOINTMENT (OUTPATIENT)
Dept: PRIMARY CARE | Facility: CLINIC | Age: 45
End: 2025-04-21
Payer: COMMERCIAL

## 2025-04-29 ENCOUNTER — APPOINTMENT (OUTPATIENT)
Dept: PRIMARY CARE | Facility: CLINIC | Age: 45
End: 2025-04-29
Payer: COMMERCIAL

## 2025-04-29 VITALS
WEIGHT: 285 LBS | SYSTOLIC BLOOD PRESSURE: 128 MMHG | BODY MASS INDEX: 48.65 KG/M2 | OXYGEN SATURATION: 97 % | HEART RATE: 68 BPM | DIASTOLIC BLOOD PRESSURE: 74 MMHG | HEIGHT: 64 IN | RESPIRATION RATE: 16 BRPM | TEMPERATURE: 97.2 F

## 2025-04-29 DIAGNOSIS — Z12.11 SCREENING FOR COLON CANCER: Primary | ICD-10-CM

## 2025-04-29 DIAGNOSIS — E07.9 THYROID DISORDER: ICD-10-CM

## 2025-04-29 DIAGNOSIS — I10 HTN (HYPERTENSION), BENIGN: ICD-10-CM

## 2025-04-29 DIAGNOSIS — I10 HYPERTENSION, UNSPECIFIED TYPE: ICD-10-CM

## 2025-04-29 DIAGNOSIS — E06.3 HYPOTHYROIDISM DUE TO HASHIMOTO THYROIDITIS: ICD-10-CM

## 2025-04-29 DIAGNOSIS — E55.9 VITAMIN D DEFICIENCY: ICD-10-CM

## 2025-04-29 DIAGNOSIS — Z13.1 SCREENING FOR DIABETES MELLITUS: ICD-10-CM

## 2025-04-29 DIAGNOSIS — Z13.0 SCREENING FOR DEFICIENCY ANEMIA: ICD-10-CM

## 2025-04-29 DIAGNOSIS — N92.4 PERIMENOPAUSAL MENORRHAGIA: ICD-10-CM

## 2025-04-29 DIAGNOSIS — E78.5 HYPERLIPIDEMIA, UNSPECIFIED HYPERLIPIDEMIA TYPE: ICD-10-CM

## 2025-04-29 DIAGNOSIS — E66.813 CLASS 3 SEVERE OBESITY DUE TO EXCESS CALORIES WITH SERIOUS COMORBIDITY AND BODY MASS INDEX (BMI) OF 45.0 TO 49.9 IN ADULT: ICD-10-CM

## 2025-04-29 DIAGNOSIS — E53.8 VITAMIN B 12 DEFICIENCY: ICD-10-CM

## 2025-04-29 DIAGNOSIS — Z13.220 SCREENING FOR CHOLESTEROL LEVEL: ICD-10-CM

## 2025-04-29 DIAGNOSIS — Z30.9 ENCOUNTER FOR CONTRACEPTIVE MANAGEMENT, UNSPECIFIED TYPE: ICD-10-CM

## 2025-04-29 PROCEDURE — 99214 OFFICE O/P EST MOD 30 MIN: CPT | Performed by: NURSE PRACTITIONER

## 2025-04-29 PROCEDURE — 3078F DIAST BP <80 MM HG: CPT | Performed by: NURSE PRACTITIONER

## 2025-04-29 PROCEDURE — 3008F BODY MASS INDEX DOCD: CPT | Performed by: NURSE PRACTITIONER

## 2025-04-29 PROCEDURE — 3074F SYST BP LT 130 MM HG: CPT | Performed by: NURSE PRACTITIONER

## 2025-04-29 RX ORDER — NORETHINDRONE ACETATE AND ETHINYL ESTRADIOL .02; 1 MG/1; MG/1
1 TABLET ORAL DAILY
Qty: 63 TABLET | Refills: 11 | Status: SHIPPED | OUTPATIENT
Start: 2025-04-29 | End: 2025-07-28

## 2025-04-29 RX ORDER — LOSARTAN POTASSIUM 50 MG/1
50 TABLET ORAL DAILY
Qty: 90 TABLET | Refills: 3 | Status: SHIPPED | OUTPATIENT
Start: 2025-04-29 | End: 2025-07-28

## 2025-04-29 ASSESSMENT — ENCOUNTER SYMPTOMS
UNEXPECTED WEIGHT CHANGE: 1
SHORTNESS OF BREATH: 0
HYPERTENSION: 1
BLURRED VISION: 0
RESPIRATORY NEGATIVE: 1
NECK PAIN: 0
ORTHOPNEA: 0
CARDIOVASCULAR NEGATIVE: 1
PND: 0
SWEATS: 0
HEADACHES: 0
PALPITATIONS: 0

## 2025-04-29 NOTE — ASSESSMENT & PLAN NOTE
Recommend Omron large blood pressure       Orders:    Comprehensive Metabolic Panel; Future    losartan (Cozaar) 50 mg tablet; Take 1 tablet (50 mg) by mouth once daily.

## 2025-04-29 NOTE — ASSESSMENT & PLAN NOTE
Periods are closer together, and heavy.   Parker had her on birth control.   Stopped it because of blood pressure issues.   Denies fibroids, denies PCOS, stays up to date with her     Orders:    norethindrone ac-eth estradioL (Loestrin 1/20, 21,) 1-20 mg-mcg tablet; Take 1 tablet by mouth once daily. Take 1 tablet daily for 21 days, then 7 tablet-free days    Testosterone, total and free; Future    DHEA-Sulfate; Future    Cortisol AM; Future    Progesterone; Future    Estradiol; Future    Estrogens, Total; Future

## 2025-04-29 NOTE — PROGRESS NOTES
"Subjective   Rylie Mcintosh is a 45 y.o. female who presents for Hypertension, Med Refill, and Establish Care.      Patient presents to Establish Care. First time meeting Margaux.  Patient presents for the following symptoms. Follow up on Blood Pressure medication needs refill.  Had a 4 day headache and was overthinking it.   She is going to do weight watchers to get back on track with her health.        Hypertension  This is a chronic problem. The current episode started more than 1 year ago. The problem is unchanged. The problem is controlled. Associated symptoms include anxiety. Pertinent negatives include no blurred vision, chest pain, headaches, malaise/fatigue, neck pain, orthopnea, palpitations, peripheral edema, PND, shortness of breath or sweats. Agents associated with hypertension include thyroid hormones. Risk factors for coronary artery disease include obesity. There are no compliance problems.        Review of Systems   Constitutional:  Positive for unexpected weight change (weight gain). Negative for malaise/fatigue.   Eyes:  Negative for blurred vision.   Respiratory: Negative.  Negative for shortness of breath.    Cardiovascular: Negative.  Negative for chest pain, palpitations, orthopnea and PND.   Musculoskeletal:  Negative for neck pain.   Neurological:  Negative for headaches.       Objective   /74 (BP Location: Right arm, Patient Position: Sitting, BP Cuff Size: Adult)   Pulse 68   Temp 36.2 °C (97.2 °F) (Temporal)   Resp 16   Ht 1.626 m (5' 4.02\")   Wt 129 kg (285 lb)   SpO2 97%   BMI 48.89 kg/m²       Physical Exam  Vitals reviewed.   HENT:      Head: Normocephalic.   Cardiovascular:      Rate and Rhythm: Normal rate and regular rhythm.      Pulses: Normal pulses.      Heart sounds: Normal heart sounds. No murmur heard.     No friction rub. No gallop.   Pulmonary:      Effort: Pulmonary effort is normal. No respiratory distress.      Breath sounds: Normal breath sounds. No stridor. " No wheezing, rhonchi or rales.   Chest:      Chest wall: No tenderness.   Neurological:      General: No focal deficit present.      Mental Status: She is alert and oriented to person, place, and time. Mental status is at baseline.   Psychiatric:         Mood and Affect: Mood normal.         Behavior: Behavior normal.         Thought Content: Thought content normal.         Judgment: Judgment normal.         Assessment & Plan  Screening for colon cancer    Orders:    Cologuard® colon cancer screening; Future    HTN (hypertension), benign  Recommend Omron large blood pressure       Orders:    Comprehensive Metabolic Panel; Future    losartan (Cozaar) 50 mg tablet; Take 1 tablet (50 mg) by mouth once daily.    Hypothyroidism due to Hashimoto thyroiditis  75 mcg       Orders:    TSH with reflex to Free T4 if abnormal; Future    Encounter for contraceptive management, unspecified type  Periods are closer together, and heavy.   Parker had her on birth control.   Stopped it because of blood pressure issues.   Denies fibroids, denies PCOS, stays up to date with her     Orders:    norethindrone ac-eth estradioL (Loestrin 1/20, 21,) 1-20 mg-mcg tablet; Take 1 tablet by mouth once daily. Take 1 tablet daily for 21 days, then 7 tablet-free days    Testosterone, total and free; Future    DHEA-Sulfate; Future    Cortisol AM; Future    Progesterone; Future    Estradiol; Future    Estrogens, Total; Future    Vitamin D deficiency    Orders:    Vitamin D 25-Hydroxy,Total (for eval of Vitamin D levels); Future    Vitamin B 12 deficiency    Orders:    Vitamin B12; Future    Perimenopausal menorrhagia    Screening for diabetes mellitus    Orders:    Hemoglobin A1C; Future    Screening for cholesterol level    Orders:    Lipid Panel; Future    Screening for deficiency anemia    Orders:    CBC; Future    Hypertension, unspecified type    Orders:    losartan (Cozaar) 50 mg tablet; Take 1 tablet (50 mg) by mouth once daily.    Thyroid  "disorder         Class 3 severe obesity due to excess calories with serious comorbidity and body mass index (BMI) of 45.0 to 49.9 in adult  Food Sources and Exercise are best methods for weight loss.    Pillars of metabolism  Sleep-light in your eyes in when it is wake time and no light at bedtime  Essential Fatty Acids- get 1000 my of EPA a day   Thyroid health- 2-3 brazilian nuts a day for selenium, adequate iodine in your diet   1-2 servings saurkraut, kimchi, kombucha or kefir for gut health to prevent leaky gut and deinflamme    Movement  Movement and exercise improves health markers  Nervous system is in charge of fat lipoylsis, mobilization, oxidation and you need epinephrine to burn fat. Shivering is an adrenaline release and can help with fat burning. Try a cold shower 1-2 times a week.   Some people overeat don't gain weight some people overeat and gain weight. The examination on people who didn't gain weight- people engaged in subtle movements \" Fidgeters\"- constant movement throughout day can burn 800-2500 calories more throughout the day. The fidgety movement helps oxidate fatty acid.   Walking many times throughout the day     Diet  Low carb diet (typically under 20-50 grams per day) is popularly referred to as ketogenic diet.. 20 grams carbs per day will put most patients in ketosis.  There are two types of omega 3s: marine omega 3s (EPA and DHA) and plant omega 3s (ALA). Marine omega 3s include fish sources, especially cold-water fatty fish such as salmon, mackerel, tuna, herring, and sardines. Examples of plant omega 3s include martin, flax, flaxseed oil, and walnuts.    Cold Therapy  Fat is controlled by neurons and the epinephrine that they release.   White (subcutaneous fat-energy storage), brown (shoulder blader, back of neck, rich with mitochondria-takes food breaks it down and converts into energy within the cells), beige (white fat that could be brown fat). Making our cells cold allows us to " build mental resilience (ice bath, cryo) adrenaline from adrenals norepinephrine from fat cells. Cold exposure allows brown fat activation and beige fat can transform to brown fat. Must shiver- if you try to remain warm the burning is stopped shivering releases succinate- increases body heat through brown fat.   Instructions: getting in and out of cold bath a few times a week to induce shivering- just cold enough to be uncomfortable 60 degrees.     Monitoring  Waist Circumference: While BMI provides a general assessment of obesity, measuring waist circumference can help assess visceral fat. Individuals with excess visceral fat are at higher risk for metabolic complications. A waist circumference >40 inches (102 cm) in men and >35 inches (88 cm) in women should raise suspicion of visceral obesity    Physiology Reviewed    Pathophysiology: Appetite and metabolism are controlled by the hypothalamus. Receives input of hormones from fat cells (leptin, adiponectin, adipokines), intestines (GLP1, PYY, CCK), and stomach (ghrelin).  Leptin: Hormone produced by stomach. Binds receptors in central nervous system (CNS), including the hypothalamus and brainstem ?  activates neural pathways that decrease appetite and increase sympathetic nervous system activity and energy expenditure (J Biol Chem 2010)  Ghrelin: Hormone produced by stomach. CNS action in hypothalamus stimulates appetite, enhances use of carbs and reduces fat utilization, increases gastric motility and acid secretion (Clin Chem 2004).  GLP-1 (glucagon-like peptide-1): hormone produced in the intestinal L cells; acts via circulation on satiety in the brain, gut motility, and insulin and glucagon secretion in the pancreatic islet (Diabetes Care 2013).      All FDA approved obesity medications are contraindicated in pregnancy.  Orlistat (Enrike, Xenical): Reversible inhibitor of gastric and pancreatic lipases. Blocks 30% of fat absorption. Brands = Enrike 60 mg tab, or  Xenical 120 mg tab taken 3 times daily with fat containing meal. Side effects: steatorrhea (oil stools), flatulence with discharge, abdominal pain. Average of 4% weight loss.  Semaglutide or Tirzepatide: GLP-1 agonist (incretin). Works on GLP-1 receptors in brain to inhibit appetite. Also slows gastric emptying and improves beta cell function in pancreas.  injected subQ daily and titrate up weekly (or as tolerated) Need at least 4% weight loss by 12 weeks to continue. Contraindicated if family history MEN2, or medullary thyroid cancer. Data on pancreatitis risk is still uncertain. Adverse effects: tachycardia, headache, hypoglycemia, GI upset, diarrhea. Discontinue at 16 weeks if at least 4% wt loss not achieved. (Ref: Lexicomp)  Phentermine/topiramate (Qsymia): Central appetite suppressant. About 8-9% weight loss on average. Side effects: Dry mouth, upper respiratory infection (pharyngitis), paresthesias, insomnia, palpitations, headaches, kidney stones, non-gap acidosis. Start phentermine/topiramate 3.75/23 mg capsule once daily. After 2 weeks increase to 7.5/46 mg once daily. At 12 weeks assess for at least 3% weight loss. Max dose 15/92 mg daily (max 7.5/46 mg if CrCl <50ml/min). Check Cr and electrolytes after 1 month (Ref: Lexicomp)  Naloxone/bupropion (Contrave): Central appetite suppressant. Contraindicated in uncontrolled HTN, anorexia/bulimia, seizure disorder, patient on linezolid. Side effects: nausea, vomiting, constipation, headache, “sleep disorder”. Comes in 8/90 mg tablets. Start 1 tab once daily for 1 week then 1 tab twice daily for 1 week, then titrate up by 1 tab weekly to max 2 tabs twice daily. Discontinue at 12 weeks if at least 5% wt loss has not been achieved.    References   Patty Y, Eve TRAVIS, Glenda O, Ayad ML. Effect of intermittent cold exposure on brown fat activation, obesity, and energy homeostasis in mice. PLoS One. 2014 Jan 17;9(1):r52398. doi: 10.1371/journal.pone.0932792.  PMID: 55210562; PMCID: [id number].

## 2025-05-01 NOTE — ASSESSMENT & PLAN NOTE
"Food Sources and Exercise are best methods for weight loss.    Pillars of metabolism  Sleep-light in your eyes in when it is wake time and no light at bedtime  Essential Fatty Acids- get 1000 my of EPA a day   Thyroid health- 2-3 brazilian nuts a day for selenium, adequate iodine in your diet   1-2 servings saurkraut, kimchi, kombucha or kefir for gut health to prevent leaky gut and deinflamme    Movement  Movement and exercise improves health markers  Nervous system is in charge of fat lipoylsis, mobilization, oxidation and you need epinephrine to burn fat. Shivering is an adrenaline release and can help with fat burning. Try a cold shower 1-2 times a week.   Some people overeat don't gain weight some people overeat and gain weight. The examination on people who didn't gain weight- people engaged in subtle movements \" Fidgeters\"- constant movement throughout day can burn 800-2500 calories more throughout the day. The fidgety movement helps oxidate fatty acid.   Walking many times throughout the day     Diet  Low carb diet (typically under 20-50 grams per day) is popularly referred to as ketogenic diet.. 20 grams carbs per day will put most patients in ketosis.  There are two types of omega 3s: marine omega 3s (EPA and DHA) and plant omega 3s (ALA). Marine omega 3s include fish sources, especially cold-water fatty fish such as salmon, mackerel, tuna, herring, and sardines. Examples of plant omega 3s include martin, flax, flaxseed oil, and walnuts.    Cold Therapy  Fat is controlled by neurons and the epinephrine that they release.   White (subcutaneous fat-energy storage), brown (shoulder blader, back of neck, rich with mitochondria-takes food breaks it down and converts into energy within the cells), beige (white fat that could be brown fat). Making our cells cold allows us to build mental resilience (ice bath, cryo) adrenaline from adrenals norepinephrine from fat cells. Cold exposure allows brown fat activation and " beige fat can transform to brown fat. Must shiver- if you try to remain warm the burning is stopped shivering releases succinate- increases body heat through brown fat.   Instructions: getting in and out of cold bath a few times a week to induce shivering- just cold enough to be uncomfortable 60 degrees.     Monitoring  Waist Circumference: While BMI provides a general assessment of obesity, measuring waist circumference can help assess visceral fat. Individuals with excess visceral fat are at higher risk for metabolic complications. A waist circumference >40 inches (102 cm) in men and >35 inches (88 cm) in women should raise suspicion of visceral obesity    Physiology Reviewed    Pathophysiology: Appetite and metabolism are controlled by the hypothalamus. Receives input of hormones from fat cells (leptin, adiponectin, adipokines), intestines (GLP1, PYY, CCK), and stomach (ghrelin).  Leptin: Hormone produced by stomach. Binds receptors in central nervous system (CNS), including the hypothalamus and brainstem ?  activates neural pathways that decrease appetite and increase sympathetic nervous system activity and energy expenditure (J Biol Chem 2010)  Ghrelin: Hormone produced by stomach. CNS action in hypothalamus stimulates appetite, enhances use of carbs and reduces fat utilization, increases gastric motility and acid secretion (Clin Chem 2004).  GLP-1 (glucagon-like peptide-1): hormone produced in the intestinal L cells; acts via circulation on satiety in the brain, gut motility, and insulin and glucagon secretion in the pancreatic islet (Diabetes Care 2013).      All FDA approved obesity medications are contraindicated in pregnancy.  Orlistat (Enrike, Xenical): Reversible inhibitor of gastric and pancreatic lipases. Blocks 30% of fat absorption. Brands = Enrike 60 mg tab, or Xenical 120 mg tab taken 3 times daily with fat containing meal. Side effects: steatorrhea (oil stools), flatulence with discharge, abdominal  pain. Average of 4% weight loss.  Semaglutide or Tirzepatide: GLP-1 agonist (incretin). Works on GLP-1 receptors in brain to inhibit appetite. Also slows gastric emptying and improves beta cell function in pancreas.  injected subQ daily and titrate up weekly (or as tolerated) Need at least 4% weight loss by 12 weeks to continue. Contraindicated if family history MEN2, or medullary thyroid cancer. Data on pancreatitis risk is still uncertain. Adverse effects: tachycardia, headache, hypoglycemia, GI upset, diarrhea. Discontinue at 16 weeks if at least 4% wt loss not achieved. (Ref: Lexicomp)  Phentermine/topiramate (Qsymia): Central appetite suppressant. About 8-9% weight loss on average. Side effects: Dry mouth, upper respiratory infection (pharyngitis), paresthesias, insomnia, palpitations, headaches, kidney stones, non-gap acidosis. Start phentermine/topiramate 3.75/23 mg capsule once daily. After 2 weeks increase to 7.5/46 mg once daily. At 12 weeks assess for at least 3% weight loss. Max dose 15/92 mg daily (max 7.5/46 mg if CrCl <50ml/min). Check Cr and electrolytes after 1 month (Ref: Lexicomp)  Naloxone/bupropion (Contrave): Central appetite suppressant. Contraindicated in uncontrolled HTN, anorexia/bulimia, seizure disorder, patient on linezolid. Side effects: nausea, vomiting, constipation, headache, “sleep disorder”. Comes in 8/90 mg tablets. Start 1 tab once daily for 1 week then 1 tab twice daily for 1 week, then titrate up by 1 tab weekly to max 2 tabs twice daily. Discontinue at 12 weeks if at least 5% wt loss has not been achieved.    References   Patty Y, Eve C, Glenda O, Ayad ML. Effect of intermittent cold exposure on brown fat activation, obesity, and energy homeostasis in mice. PLoS One. 2014 Jan 17;9(1):p30698. doi: 10.1371/journal.pone.6191809. PMID: 61227953; PMCID: [id number].

## 2025-05-04 LAB
25(OH)D3+25(OH)D2 SERPL-MCNC: 21 NG/ML (ref 30–100)
ALBUMIN SERPL-MCNC: 4.5 G/DL (ref 3.6–5.1)
ALP SERPL-CCNC: 58 U/L (ref 31–125)
ALT SERPL-CCNC: 22 U/L (ref 6–29)
ANION GAP SERPL CALCULATED.4IONS-SCNC: 10 MMOL/L (CALC) (ref 7–17)
AST SERPL-CCNC: 18 U/L (ref 10–35)
BILIRUB SERPL-MCNC: 0.6 MG/DL (ref 0.2–1.2)
BUN SERPL-MCNC: 10 MG/DL (ref 7–25)
CALCIUM SERPL-MCNC: 9.2 MG/DL (ref 8.6–10.2)
CHLORIDE SERPL-SCNC: 102 MMOL/L (ref 98–110)
CHOLEST SERPL-MCNC: 279 MG/DL
CHOLEST/HDLC SERPL: 2.8 (CALC)
CO2 SERPL-SCNC: 26 MMOL/L (ref 20–32)
CORTIS AM PEAK SERPL-MCNC: 8.6 MCG/DL
CREAT SERPL-MCNC: 0.8 MG/DL (ref 0.5–0.99)
DHEA-S SERPL-MCNC: 116 MCG/DL (ref 15–205)
EGFRCR SERPLBLD CKD-EPI 2021: 93 ML/MIN/1.73M2
ERYTHROCYTE [DISTWIDTH] IN BLOOD BY AUTOMATED COUNT: 12.9 % (ref 11–15)
EST. AVERAGE GLUCOSE BLD GHB EST-MCNC: 108 MG/DL
EST. AVERAGE GLUCOSE BLD GHB EST-SCNC: 6 MMOL/L
ESTRADIOL SERPL-MCNC: 149 PG/ML
ESTROGEN SERPL-MCNC: 325 PG/ML
GLUCOSE SERPL-MCNC: 86 MG/DL (ref 65–99)
HBA1C MFR BLD: 5.4 %
HCT VFR BLD AUTO: 41.3 % (ref 35–45)
HDLC SERPL-MCNC: 98 MG/DL
HGB BLD-MCNC: 13.5 G/DL (ref 11.7–15.5)
LDLC SERPL CALC-MCNC: 152 MG/DL (CALC)
MCH RBC QN AUTO: 29.2 PG (ref 27–33)
MCHC RBC AUTO-ENTMCNC: 32.7 G/DL (ref 32–36)
MCV RBC AUTO: 89.2 FL (ref 80–100)
NONHDLC SERPL-MCNC: 181 MG/DL (CALC)
PLATELET # BLD AUTO: 273 THOUSAND/UL (ref 140–400)
PMV BLD REES-ECKER: 10 FL (ref 7.5–12.5)
POTASSIUM SERPL-SCNC: 4.3 MMOL/L (ref 3.5–5.3)
PROGEST SERPL-MCNC: 4.5 NG/ML
PROT SERPL-MCNC: 7.1 G/DL (ref 6.1–8.1)
RBC # BLD AUTO: 4.63 MILLION/UL (ref 3.8–5.1)
SODIUM SERPL-SCNC: 138 MMOL/L (ref 135–146)
TESTOST FREE SERPL-MCNC: 2 PG/ML (ref 0.1–6.4)
TESTOST SERPL-MCNC: 28 NG/DL (ref 2–45)
TRIGL SERPL-MCNC: 156 MG/DL
TSH SERPL-ACNC: 3.81 MIU/L
VIT B12 SERPL-MCNC: 431 PG/ML (ref 200–1100)
WBC # BLD AUTO: 5.4 THOUSAND/UL (ref 3.8–10.8)

## 2025-05-26 LAB — NONINV COLON CA DNA+OCC BLD SCRN STL QL: NEGATIVE

## 2025-05-29 ENCOUNTER — APPOINTMENT (OUTPATIENT)
Facility: HOSPITAL | Age: 45
End: 2025-05-29
Payer: COMMERCIAL

## 2025-05-29 DIAGNOSIS — I10 HYPERTENSION, UNSPECIFIED TYPE: ICD-10-CM

## 2025-05-29 PROCEDURE — 75571 CT HRT W/O DYE W/CA TEST: CPT

## 2025-06-22 ASSESSMENT — PROMIS GLOBAL HEALTH SCALE
RATE_SOCIAL_SATISFACTION: VERY GOOD
RATE_AVERAGE_PAIN: 2
RATE_MENTAL_HEALTH: VERY GOOD
RATE_QUALITY_OF_LIFE: VERY GOOD
RATE_PHYSICAL_HEALTH: GOOD
CARRYOUT_SOCIAL_ACTIVITIES: VERY GOOD
RATE_GENERAL_HEALTH: GOOD
CARRYOUT_PHYSICAL_ACTIVITIES: MODERATELY
EMOTIONAL_PROBLEMS: RARELY
RATE_AVERAGE_FATIGUE: MODERATE

## 2025-06-27 ENCOUNTER — APPOINTMENT (OUTPATIENT)
Dept: PRIMARY CARE | Facility: CLINIC | Age: 45
End: 2025-06-27
Payer: COMMERCIAL

## 2025-06-27 VITALS
HEIGHT: 64 IN | DIASTOLIC BLOOD PRESSURE: 83 MMHG | SYSTOLIC BLOOD PRESSURE: 120 MMHG | OXYGEN SATURATION: 96 % | RESPIRATION RATE: 16 BRPM | WEIGHT: 280 LBS | HEART RATE: 79 BPM | BODY MASS INDEX: 47.8 KG/M2

## 2025-06-27 DIAGNOSIS — E06.3 HYPOTHYROIDISM DUE TO HASHIMOTO THYROIDITIS: ICD-10-CM

## 2025-06-27 DIAGNOSIS — K13.70 ORAL LESION: ICD-10-CM

## 2025-06-27 DIAGNOSIS — Z12.31 ENCOUNTER FOR SCREENING MAMMOGRAM FOR MALIGNANT NEOPLASM OF BREAST: ICD-10-CM

## 2025-06-27 DIAGNOSIS — J30.9 ALLERGIC RHINITIS, UNSPECIFIED SEASONALITY, UNSPECIFIED TRIGGER: ICD-10-CM

## 2025-06-27 DIAGNOSIS — E78.5 HYPERLIPIDEMIA, UNSPECIFIED HYPERLIPIDEMIA TYPE: ICD-10-CM

## 2025-06-27 DIAGNOSIS — N92.4 PERIMENOPAUSAL MENORRHAGIA: ICD-10-CM

## 2025-06-27 DIAGNOSIS — Z12.83 SKIN CANCER SCREENING: ICD-10-CM

## 2025-06-27 DIAGNOSIS — E66.813 CLASS 3 SEVERE OBESITY DUE TO EXCESS CALORIES WITH SERIOUS COMORBIDITY AND BODY MASS INDEX (BMI) OF 45.0 TO 49.9 IN ADULT: ICD-10-CM

## 2025-06-27 DIAGNOSIS — Z00.00 ROUTINE GENERAL MEDICAL EXAMINATION AT HEALTH CARE FACILITY: Primary | ICD-10-CM

## 2025-06-27 DIAGNOSIS — I10 HTN (HYPERTENSION), BENIGN: ICD-10-CM

## 2025-06-27 DIAGNOSIS — E07.9 THYROID DISORDER: ICD-10-CM

## 2025-06-27 PROBLEM — Z76.0 ENCOUNTER FOR MEDICATION REFILL: Status: RESOLVED | Noted: 2024-06-24 | Resolved: 2025-06-27

## 2025-06-27 PROBLEM — Z71.2 ENCOUNTER TO DISCUSS TEST RESULTS: Status: RESOLVED | Noted: 2024-06-24 | Resolved: 2025-06-27

## 2025-06-27 PROCEDURE — 99396 PREV VISIT EST AGE 40-64: CPT | Performed by: NURSE PRACTITIONER

## 2025-06-27 PROCEDURE — 3008F BODY MASS INDEX DOCD: CPT | Performed by: NURSE PRACTITIONER

## 2025-06-27 PROCEDURE — 3079F DIAST BP 80-89 MM HG: CPT | Performed by: NURSE PRACTITIONER

## 2025-06-27 PROCEDURE — 3074F SYST BP LT 130 MM HG: CPT | Performed by: NURSE PRACTITIONER

## 2025-06-27 RX ORDER — LEVOTHYROXINE SODIUM 75 UG/1
75 TABLET ORAL DAILY
Qty: 90 TABLET | Refills: 3 | Status: SHIPPED | OUTPATIENT
Start: 2025-06-27

## 2025-06-27 RX ORDER — FLUTICASONE PROPIONATE 50 MCG
2 SPRAY, SUSPENSION (ML) NASAL DAILY
Qty: 16 G | Refills: 5 | Status: SHIPPED | OUTPATIENT
Start: 2025-06-27 | End: 2025-06-27 | Stop reason: SDUPTHER

## 2025-06-27 RX ORDER — ACYCLOVIR 50 MG/G
1 OINTMENT TOPICAL
Qty: 5 G | Refills: 0 | Status: SHIPPED | OUTPATIENT
Start: 2025-06-27 | End: 2025-06-27 | Stop reason: SDUPTHER

## 2025-06-27 RX ORDER — FLUTICASONE PROPIONATE 50 MCG
2 SPRAY, SUSPENSION (ML) NASAL DAILY
Qty: 16 G | Refills: 5 | Status: SHIPPED | OUTPATIENT
Start: 2025-06-27 | End: 2026-06-27

## 2025-06-27 RX ORDER — ACYCLOVIR 50 MG/G
1 OINTMENT TOPICAL
Qty: 5 G | Refills: 0 | Status: SHIPPED | OUTPATIENT
Start: 2025-06-27 | End: 2025-07-01

## 2025-06-27 ASSESSMENT — ENCOUNTER SYMPTOMS
SINUS PAIN: 1
SORE THROAT: 0
PALPITATIONS: 0
MUSCULOSKELETAL NEGATIVE: 1
VOICE CHANGE: 0
ALLERGIC/IMMUNOLOGIC NEGATIVE: 1
RHINORRHEA: 0
COUGH: 0
WHEEZING: 0
EYES NEGATIVE: 1
TROUBLE SWALLOWING: 0
STRIDOR: 0
SHORTNESS OF BREATH: 0
ENDOCRINE NEGATIVE: 1
APNEA: 0
SINUS PRESSURE: 1
RESPIRATORY NEGATIVE: 1
HEMATOLOGIC/LYMPHATIC NEGATIVE: 1
CHEST TIGHTNESS: 0
CARDIOVASCULAR NEGATIVE: 1
CHOKING: 0
GASTROINTESTINAL NEGATIVE: 1
NEUROLOGICAL NEGATIVE: 1
PSYCHIATRIC NEGATIVE: 1
CONSTITUTIONAL NEGATIVE: 1

## 2025-06-27 ASSESSMENT — PATIENT HEALTH QUESTIONNAIRE - PHQ9
SUM OF ALL RESPONSES TO PHQ9 QUESTIONS 1 AND 2: 0
1. LITTLE INTEREST OR PLEASURE IN DOING THINGS: NOT AT ALL
2. FEELING DOWN, DEPRESSED OR HOPELESS: NOT AT ALL

## 2025-06-27 ASSESSMENT — ANXIETY QUESTIONNAIRES
1. FEELING NERVOUS, ANXIOUS, OR ON EDGE: NOT AT ALL
7. FEELING AFRAID AS IF SOMETHING AWFUL MIGHT HAPPEN: NOT AT ALL
4. TROUBLE RELAXING: NOT AT ALL
3. WORRYING TOO MUCH ABOUT DIFFERENT THINGS: NOT AT ALL
2. NOT BEING ABLE TO STOP OR CONTROL WORRYING: NOT AT ALL
6. BECOMING EASILY ANNOYED OR IRRITABLE: NOT AT ALL
5. BEING SO RESTLESS THAT IT IS HARD TO SIT STILL: NOT AT ALL
GAD7 TOTAL SCORE: 0

## 2025-06-27 NOTE — PROGRESS NOTES
Subjective   Rylie Mcintosh is a 45 y.o. female who presents for Annual Exam.  Patient presents for her Annual Physical.  Medication Refill  Referral for Dermatologist    Cologualuis 05/17/25  CT cardiac scoring wo IV contrast done 05/29/25  Last Mammogram 11/20/24- ordered for November 2025       Below is the patient's most recent value for Albumin, ALT, AST, BUN, Calcium, Chloride, Cholesterol, CO2, Creatinine, GFR, Glucose, HDL, Hematocrit, Hemoglobin, Hemoglobin A1C, LDL, Magnesium, Phosphorus, Platelets, Potassium, PSA, Sodium, Triglycerides, and WBC.   Lab Results   Component Value Date    ALBUMIN 4.5 04/29/2025    ALT 22 04/29/2025    AST 18 04/29/2025    BUN 10 04/29/2025    CALCIUM 9.2 04/29/2025     04/29/2025    CHOL 279 (H) 04/29/2025    CO2 26 04/29/2025    CREATININE 0.80 04/29/2025    HDL 98 04/29/2025    HCT 41.3 04/29/2025    HGB 13.5 04/29/2025    HGBA1C 5.4 04/29/2025    MG 2.10 05/21/2024     04/29/2025    K 4.3 04/29/2025     04/29/2025    TRIG 156 (H) 04/29/2025    WBC 5.4 04/29/2025     Note: for a comprehensive list of the patient's lab results, access the Results Review activity.  Review of Systems   Constitutional: Negative.    HENT:  Positive for congestion, sinus pressure and sinus pain. Negative for nosebleeds, postnasal drip, rhinorrhea, sneezing, sore throat, tinnitus, trouble swallowing and voice change.    Eyes: Negative.    Respiratory: Negative.  Negative for apnea, cough, choking, chest tightness, shortness of breath, wheezing and stridor.    Cardiovascular: Negative.  Negative for chest pain, palpitations and leg swelling.   Gastrointestinal: Negative.    Endocrine: Negative.    Genitourinary: Negative.    Musculoskeletal: Negative.    Skin: Negative.    Allergic/Immunologic: Negative.    Neurological: Negative.    Hematological: Negative.    Psychiatric/Behavioral: Negative.     All other systems reviewed and are negative.      Objective   Physical Exam  Vitals  "and nursing note reviewed.   Constitutional:       Appearance: Normal appearance.   HENT:      Head: Normocephalic and atraumatic.      Right Ear: Tympanic membrane, ear canal and external ear normal.      Left Ear: Tympanic membrane, ear canal and external ear normal.      Nose: Nose normal.      Mouth/Throat:      Mouth: Mucous membranes are dry.   Eyes:      Pupils: Pupils are equal, round, and reactive to light.   Cardiovascular:      Rate and Rhythm: Normal rate and regular rhythm.      Pulses: Normal pulses.      Heart sounds: Normal heart sounds. No murmur heard.     No friction rub. No gallop.   Pulmonary:      Effort: Pulmonary effort is normal.      Breath sounds: Normal breath sounds.   Abdominal:      General: Abdomen is flat and protuberant.      Palpations: Abdomen is soft.      Tenderness: There is no abdominal tenderness.   Musculoskeletal:         General: Normal range of motion.      Cervical back: Normal range of motion.   Skin:     Capillary Refill: Capillary refill takes 2 to 3 seconds.   Neurological:      General: No focal deficit present.      Mental Status: She is alert and oriented to person, place, and time.      Cranial Nerves: Cranial nerves 2-12 are intact.      Sensory: Sensation is intact.      Motor: Motor function is intact.      Deep Tendon Reflexes: Reflexes are normal and symmetric.   Psychiatric:         Mood and Affect: Mood normal.         Behavior: Behavior normal.         Thought Content: Thought content normal.         Judgment: Judgment normal.       /83 (BP Location: Right arm, Patient Position: Sitting, BP Cuff Size: Adult)   Pulse 79   Resp 16   Ht 1.626 m (5' 4.02\")   Wt 127 kg (280 lb)   LMP 06/27/2025   SpO2 96%   BMI 48.03 kg/m²       Assessment/Plan   Problem List Items Addressed This Visit       Hyperlipemia    Hypothyroidism    75 mcg            HTN (hypertension), benign    Losartan 50 mg          Class 3 severe obesity due to excess calories with " serious comorbidity and body mass index (BMI) of 45.0 to 49.9 in adult    Perimenopausal menorrhagia    Better with oral contraception           Other Visit Diagnoses         Routine general medical examination at health care facility    -  Primary      Encounter for screening mammogram for malignant neoplasm of breast        Relevant Orders    BI mammo bilateral screening tomosynthesis      Skin cancer screening        Relevant Orders    Referral to Dermatology      Thyroid disorder        Relevant Medications    levothyroxine (Synthroid, Levoxyl) 75 mcg tablet      Allergic rhinitis, unspecified seasonality, unspecified trigger        Relevant Medications    fluticasone (Flonase) 50 mcg/actuation nasal spray      Oral lesion        Relevant Medications    acyclovir (Zovirax) 5 % ointment

## 2025-07-06 ENCOUNTER — OFFICE VISIT (OUTPATIENT)
Dept: URGENT CARE | Age: 45
End: 2025-07-06
Payer: COMMERCIAL

## 2025-07-06 VITALS
TEMPERATURE: 97.7 F | OXYGEN SATURATION: 96 % | DIASTOLIC BLOOD PRESSURE: 87 MMHG | RESPIRATION RATE: 16 BRPM | HEART RATE: 75 BPM | SYSTOLIC BLOOD PRESSURE: 130 MMHG

## 2025-07-06 DIAGNOSIS — J02.9 SORE THROAT: ICD-10-CM

## 2025-07-06 DIAGNOSIS — J02.9 PHARYNGITIS, UNSPECIFIED ETIOLOGY: Primary | ICD-10-CM

## 2025-07-06 LAB
POC HUMAN RHINOVIRUS PCR: NEGATIVE
POC INFLUENZA A VIRUS PCR: NEGATIVE
POC INFLUENZA B VIRUS PCR: NEGATIVE
POC RESPIRATORY SYNCYTIAL VIRUS PCR: NEGATIVE
POC STREPTOCOCCUS PYOGENES (GROUP A STREP) PCR: NEGATIVE

## 2025-07-06 RX ORDER — PREDNISONE 20 MG/1
20 TABLET ORAL 2 TIMES DAILY
Qty: 10 TABLET | Refills: 0 | Status: SHIPPED | OUTPATIENT
Start: 2025-07-06 | End: 2025-07-11

## 2025-07-06 ASSESSMENT — ENCOUNTER SYMPTOMS
CHEST TIGHTNESS: 0
VOMITING: 0
FEVER: 0
CONFUSION: 0
RHINORRHEA: 0
NAUSEA: 0
COUGH: 0
SORE THROAT: 1
ABDOMINAL PAIN: 0
FATIGUE: 0
DIZZINESS: 0
HEADACHES: 0
AGITATION: 0
SINUS PAIN: 0
DIARRHEA: 0
CHILLS: 0
VOICE CHANGE: 0
SHORTNESS OF BREATH: 0
SINUS PRESSURE: 0

## 2025-07-06 NOTE — PROGRESS NOTES
Subjective   Patient ID: Rylie Mcintosh is a 45 y.o. female. They present today with a chief complaint of Sore Throat (X 10 days).    History of Present Illness  Pt denies severe pain but reports lingering throat discomfort. Denies fever, chills, cough. Reports chronic sinus issues but feels throat pain is not related to congestion. Hasn't tried any meds yet. Denies exposure. Denies hx of seasonal allergy / GERD      Sore Throat   Associated symptoms include congestion. Pertinent negatives include no abdominal pain, coughing, diarrhea, headaches, shortness of breath or vomiting.       Past Medical History  Allergies as of 07/06/2025 - Reviewed 07/06/2025   Allergen Reaction Noted    Sulfa (sulfonamide antibiotics) Other, GI Upset, Hives, Itching, Myalgia, and Nausea/vomiting 09/19/2000       Prescriptions Prior to Admission[1]     Medical History[2]    Surgical History[3]     reports that she has never smoked. She has never been exposed to tobacco smoke. She has never used smokeless tobacco. She reports current alcohol use. She reports that she does not use drugs.    Review of Systems  Review of Systems   Constitutional:  Negative for chills, fatigue and fever.   HENT:  Positive for congestion and sore throat. Negative for rhinorrhea, sinus pressure, sinus pain, sneezing and voice change.    Respiratory:  Negative for cough, chest tightness and shortness of breath.    Cardiovascular:  Negative for chest pain.   Gastrointestinal:  Negative for abdominal pain, diarrhea, nausea and vomiting.   Skin:  Negative for rash.   Neurological:  Negative for dizziness and headaches.   Psychiatric/Behavioral:  Negative for agitation and confusion.                                   Objective    Vitals:    07/06/25 1157   BP: 130/87   BP Location: Right arm   Patient Position: Sitting   BP Cuff Size: Large adult   Pulse: 75   Resp: 16   Temp: 36.5 °C (97.7 °F)   SpO2: 96%     Patient's last menstrual period was  06/07/2025.    Physical Exam  Constitutional:       Appearance: Normal appearance.   HENT:      Head: Normocephalic and atraumatic.      Right Ear: Tympanic membrane, ear canal and external ear normal.      Left Ear: Tympanic membrane, ear canal and external ear normal.      Nose: Nose normal. No rhinorrhea.      Mouth/Throat:      Pharynx: Posterior oropharyngeal erythema present.   Cardiovascular:      Rate and Rhythm: Normal rate and regular rhythm.      Heart sounds: No murmur heard.  Pulmonary:      Effort: Pulmonary effort is normal.      Breath sounds: Normal breath sounds. No wheezing.   Abdominal:      General: Abdomen is flat.      Palpations: Abdomen is soft.   Musculoskeletal:         General: Normal range of motion.   Lymphadenopathy:      Cervical: No cervical adenopathy.   Neurological:      Mental Status: She is alert and oriented to person, place, and time.   Psychiatric:         Mood and Affect: Mood normal.         Procedures    Point of Care Test & Imaging Results from this visit  Results for orders placed or performed in visit on 07/06/25   POCT SPOTFIRE R/ST Panel [facility]Strep [facility] manually resulted   Result Value Ref Range    POC Group A Strep, PCR Negative Negative    POC Respiratory Syncytial Virus PCR Negative Negative    POC Influenza A Virus PCR Negative Negative    POC Influenza B Virus PCR Negative Negative    POC Human Rhinovirus PCR Negative Negative      Imaging  No results found.    Cardiology, Vascular, and Other Imaging  No other imaging results found for the past 2 days      Diagnostic study results (if any) were reviewed by Elsy Dodd PA-C.    Assessment/Plan   Allergies, medications, history, and pertinent labs/EKGs/Imaging reviewed by Elsy Dodd PA-C.     Medical Decision Making  POCT all negative  No signs indicating peritonsillar abscess/postpharyngeal abscess  Possible viral pharyngitis vs allergy post nasal drip vs GERD  Will treat with short course of steroid  and recommended further evaluation with primary care team if no improvement      Orders and Diagnoses  Diagnoses and all orders for this visit:  Pharyngitis, unspecified etiology  -     predniSONE (Deltasone) 20 mg tablet; Take 1 tablet (20 mg) by mouth 2 times a day for 5 days.  Sore throat  -     POCT SPOTFIRE R/ST Panel Mini w/Strep A [facility]) manually resulted      Medical Admin Record      Patient disposition: Home    Electronically signed by Elsy Dodd PA-C  12:23 PM           [1] (Not in a hospital admission)   [2]   Past Medical History:  Diagnosis Date    Calculus of gallbladder without cholecystitis without obstruction 03/28/2016    Gallstones    Depression 2003    Disease of thyroid gland 2010    Hypertension 5/2024    Unspecified abnormal cytological findings in specimens from vagina     Abnormal vaginal Pap smear   [3]   Past Surgical History:  Procedure Laterality Date    CHOLECYSTECTOMY  10/10/2016    Cholecystectomy Laparoscopic    MOUTH SURGERY  03/28/2016    Oral Surgery Tooth Extraction

## 2025-11-21 ENCOUNTER — APPOINTMENT (OUTPATIENT)
Dept: RADIOLOGY | Facility: CLINIC | Age: 45
End: 2025-11-21
Payer: COMMERCIAL